# Patient Record
Sex: FEMALE | Race: WHITE | NOT HISPANIC OR LATINO | Employment: OTHER | URBAN - METROPOLITAN AREA
[De-identification: names, ages, dates, MRNs, and addresses within clinical notes are randomized per-mention and may not be internally consistent; named-entity substitution may affect disease eponyms.]

---

## 2018-08-20 ENCOUNTER — TELEPHONE (OUTPATIENT)
Dept: FAMILY MEDICINE CLINIC | Facility: CLINIC | Age: 73
End: 2018-08-20

## 2018-08-20 ENCOUNTER — OFFICE VISIT (OUTPATIENT)
Dept: FAMILY MEDICINE CLINIC | Facility: CLINIC | Age: 73
End: 2018-08-20

## 2018-08-20 VITALS
OXYGEN SATURATION: 98 % | TEMPERATURE: 99.7 F | DIASTOLIC BLOOD PRESSURE: 92 MMHG | HEIGHT: 65 IN | SYSTOLIC BLOOD PRESSURE: 140 MMHG | BODY MASS INDEX: 35.16 KG/M2 | WEIGHT: 211 LBS | HEART RATE: 62 BPM | RESPIRATION RATE: 16 BRPM

## 2018-08-20 DIAGNOSIS — H91.93 BILATERAL HEARING LOSS, UNSPECIFIED HEARING LOSS TYPE: ICD-10-CM

## 2018-08-20 DIAGNOSIS — N95.1 HOT FLASHES DUE TO MENOPAUSE: ICD-10-CM

## 2018-08-20 DIAGNOSIS — G43.009 MIGRAINE WITHOUT AURA AND WITHOUT STATUS MIGRAINOSUS, NOT INTRACTABLE: Primary | ICD-10-CM

## 2018-08-20 DIAGNOSIS — E78.00 PURE HYPERCHOLESTEROLEMIA: ICD-10-CM

## 2018-08-20 DIAGNOSIS — M15.9 PRIMARY OSTEOARTHRITIS INVOLVING MULTIPLE JOINTS: ICD-10-CM

## 2018-08-20 PROBLEM — M15.0 PRIMARY OSTEOARTHRITIS INVOLVING MULTIPLE JOINTS: Status: ACTIVE | Noted: 2018-08-20

## 2018-08-20 PROCEDURE — 99204 OFFICE O/P NEW MOD 45 MIN: CPT | Performed by: FAMILY MEDICINE

## 2018-08-20 RX ORDER — SUMATRIPTAN 50 MG/1
50 TABLET, FILM COATED ORAL ONCE AS NEEDED
Qty: 27 TABLET | Refills: 1 | Status: SHIPPED | OUTPATIENT
Start: 2018-08-20 | End: 2018-10-05 | Stop reason: SDUPTHER

## 2018-08-20 RX ORDER — PRAVASTATIN SODIUM 40 MG
TABLET ORAL
COMMUNITY
End: 2018-08-20 | Stop reason: SDUPTHER

## 2018-08-20 RX ORDER — SUMATRIPTAN SUCCINATE 50 MG
TABLET ORAL
COMMUNITY
Start: 2018-07-30 | End: 2018-08-20

## 2018-08-20 RX ORDER — PROPRANOLOL HYDROCHLORIDE 120 MG/1
CAPSULE, EXTENDED RELEASE ORAL
COMMUNITY
Start: 2018-07-11 | End: 2018-08-20 | Stop reason: SDUPTHER

## 2018-08-20 RX ORDER — CELECOXIB 200 MG/1
200 CAPSULE ORAL DAILY
Qty: 90 CAPSULE | Refills: 1 | Status: SHIPPED | OUTPATIENT
Start: 2018-08-20 | End: 2019-02-28 | Stop reason: SDUPTHER

## 2018-08-20 RX ORDER — PRAVASTATIN SODIUM 40 MG
40 TABLET ORAL NIGHTLY
Qty: 90 TABLET | Refills: 1 | Status: SHIPPED | OUTPATIENT
Start: 2018-08-20 | End: 2019-02-28 | Stop reason: SDUPTHER

## 2018-08-20 RX ORDER — CELECOXIB 200 MG/1
CAPSULE ORAL
COMMUNITY
Start: 2018-07-15 | End: 2018-08-20 | Stop reason: SDUPTHER

## 2018-08-20 RX ORDER — PROPRANOLOL HYDROCHLORIDE 120 MG/1
120 CAPSULE, EXTENDED RELEASE ORAL DAILY
Qty: 90 CAPSULE | Refills: 1 | Status: SHIPPED | OUTPATIENT
Start: 2018-08-20 | End: 2019-02-28 | Stop reason: SDUPTHER

## 2018-08-20 RX ORDER — GARLIC 180 MG
TABLET, DELAYED RELEASE (ENTERIC COATED) ORAL
COMMUNITY
End: 2021-04-27

## 2018-08-20 RX ORDER — SUMATRIPTAN 50 MG/1
TABLET, FILM COATED ORAL
COMMUNITY
End: 2018-08-20 | Stop reason: SDUPTHER

## 2018-08-20 RX ORDER — CHLORAL HYDRATE 500 MG
CAPSULE ORAL
COMMUNITY

## 2018-08-20 RX ORDER — VENLAFAXINE HYDROCHLORIDE 75 MG/1
75 CAPSULE, EXTENDED RELEASE ORAL DAILY
Qty: 90 CAPSULE | Refills: 1 | Status: SHIPPED | OUTPATIENT
Start: 2018-08-20 | End: 2019-02-28 | Stop reason: SDUPTHER

## 2018-08-20 RX ORDER — VENLAFAXINE HYDROCHLORIDE 75 MG/1
CAPSULE, EXTENDED RELEASE ORAL
COMMUNITY
Start: 2018-07-11 | End: 2018-08-20 | Stop reason: SDUPTHER

## 2018-08-20 RX ORDER — METHYLPREDNISOLONE 4 MG
TABLET, DOSE PACK ORAL
COMMUNITY
End: 2021-06-10

## 2018-08-20 NOTE — TELEPHONE ENCOUNTER
----- Message from Raven Douglas MD sent at 8/20/2018  2:06 PM EDT -----  Regarding: req rec  Last hearing test and office note    Soco Elizondo, [facility]  Audiology 08/20/2018 End  8/20/18  Phone: [phone]; Fax: [phone]

## 2018-08-20 NOTE — PROGRESS NOTES
Gavi Nye presents today to establish care.    Chief Complaint   Patient presents with   • Establish Care     Needs a new rx for a hearing test         HPI   Hearing loss:  Hearing aids not doing the same job. Needs a new hearing test prescription. Wearing hearing aid since age 38.     Hot flashes:  Taking effexor for 1-2 years for hot flashes along with black cohosh. Sweat starts pouring. Hysterectomy in her 40s. No depression.   PHQ-2/PHQ-9 Depression Screening 8/20/2018   Little interest or pleasure in doing things 0   Feeling down, depressed, or hopeless 0   Total Score 0         Migraines:  Used to have migraines related to menses. If she gets severe sinus headaches with changes in weather turns into migraine. Takes aspirin and antihistamine first, if getting worse takes Imitrex. Taking propanol helps cut down migraines and not as severe. H/o cluster migraines for 3 days in a row. During migraine diarrhea, nausea w/o vomiting, irritable, needs dark and quiet. Right frontal or bilateral behind the eye, pressure.     Hyperlipidemia:  Taking cholesterol medication for a little over a year. Tries low fat diet, but eats chocolate and cheese. Eating vegetables.     Arthritis:  Bilateral hands, thumbs. S/p left knee surgery. Taking celebrex daily. Stiff when she doesn't take it, especially in knees and tops of feet. Decreased 3 inches in height. Walking and starting this [facility].               Review of Systems   Constitutional: Positive for unexpected weight gain.   HENT: Negative.    Eyes: Positive for discharge and itching.   Respiratory: Positive for wheezing.    Gastrointestinal: Negative.    Endocrine:        Hot flashes   Genitourinary: Positive for urinary incontinence.   Musculoskeletal: Positive for arthralgias.   Skin: Positive for rash.        itching   Allergic/Immunologic: Positive for environmental allergies.   Neurological: Positive for dizziness, syncope, weakness and  headache.   Psychiatric/Behavioral: Negative.         Past Medical History:   Diagnosis Date   • Anemia    • Arthritis    • Asthma    • Cholelithiasis    • H/O knee surgery    • Migraine         Past Surgical History:   Procedure Laterality Date   • KNEE ARTHROSCOPY Left    • PARTIAL KNEE ARTHROPLASTY Left    • ROTATOR CUFF REPAIR Left    • TONSILLECTOMY     • TOTAL ABDOMINAL HYSTERECTOMY WITH SALPINGO OOPHORECTOMY          Family History   Problem Relation Age of Onset   • Arthritis Mother    • Cancer Mother    • Migraines Mother    • Osteoporosis Mother    • Heart attack Father    • Cancer Father    • Thyroid disease Daughter         Social History     Social History   • Marital status:      Spouse name: N/A   • Number of children: N/A   • Years of education: N/A     Occupational History   • Not on file.     Social History Main Topics   • Smoking status: Former Smoker     Packs/day: 1.00     Years: 20.00     Types: Cigarettes     Start date: 8/20/1998     Quit date: 8/2/2001   • Smokeless tobacco: Never Used   • Alcohol use 1.2 - 2.4 oz/week     2 - 4 Glasses of wine per week      Comment: soical    • Drug use: No   • Sexual activity: Not Currently     Partners: Male     Birth control/ protection: None     Other Topics Concern   • Not on file     Social History Narrative   • No narrative on file        Current Outpatient Prescriptions   Medication Sig Dispense Refill   • ASPIRIN 81 PO aspirin     • Black Cohosh 40 MG capsule Take  by mouth.     • celecoxib (CeleBREX) 200 MG capsule      • Cholecalciferol (VITAMIN D) 1000 units tablet Vitamin D     • Glucosamine Sulfate 500 MG tablet glucosamine sulfate 500 mg tablet     • Omega-3 Fatty Acids (FISH OIL) 1000 MG capsule capsule Fish Oil     • pravastatin (PRAVACHOL) 40 MG tablet pravastatin 40 mg tablet     • propranolol LA (INDERAL LA) 120 MG 24 hr capsule      • SUMAtriptan (IMITREX) 50 MG tablet Imitrex 50 mg tablet     • venlafaxine XR (EFFEXOR-XR) 75 MG  "24 hr capsule        No current facility-administered medications for this visit.        No Known Allergies     Visit Vitals  /92   Pulse 62   Temp 99.7 °F (37.6 °C) (Temporal Artery )   Resp 16   Ht 164 cm (64.57\")   Wt 95.7 kg (211 lb)   SpO2 98%   BMI 35.58 kg/m²        Physical Exam   Constitutional: She is oriented to person, place, and time. No distress.   obese   HENT:   Nose: Nose normal.   Mouth/Throat: Oropharynx is clear and moist.   Eyes: Pupils are equal, round, and reactive to light. Conjunctivae are normal.   Neck: Neck supple. No thyromegaly present.   Cardiovascular: Normal rate, regular rhythm and intact distal pulses.    No murmur heard.  Pulses:       Posterior tibial pulses are 2+ on the right side, and 2+ on the left side.   Pulmonary/Chest: Effort normal and breath sounds normal.   Abdominal: Soft. There is no hepatosplenomegaly. There is no tenderness.   Surgical scar   Musculoskeletal: She exhibits no edema.        Left knee: She exhibits no effusion.        Arms:       Legs:  Lymphadenopathy:     She has no cervical adenopathy.   Neurological: She is alert and oriented to person, place, and time.   Skin: Skin is warm and dry. No rash noted.   Psychiatric: She has a normal mood and affect. Her behavior is normal. Judgment and thought content normal.   Vitals reviewed.            Gavi was seen today for establish care.  Obtain prior PCP records, labs, and immunizations.  Patient reports prior shingles vaccine in 2012 and prior pneumococcal vaccine unknown date.  She has had bone density in [id number].  She's had colonoscopy in 2016.  She is unsure when her last mammogram has been.  Diagnoses and all orders for this visit:    Migraine without aura and without status migrainosus, not intractable  -     SUMAtriptan (IMITREX) 50 MG tablet; Take 1 tablet by mouth 1 (One) Time As Needed for Migraine for up to 1 dose. May repeat dose once in 2 hours if needed  -     propranolol LA (INDERAL LA) " 120 MG 24 hr capsule; Take 1 capsule by mouth Daily.  Continue daily suppression with propranolol.  Imitrex when necessary.  Pure hypercholesterolemia  -     pravastatin (PRAVACHOL) 40 MG tablet; Take 1 tablet by mouth Every Night.  Continue statin.  Patient's Body mass index is 35.58 kg/m². BMI is above normal parameters. Recommendations include: exercise counseling and nutrition counseling.  Primary osteoarthritis involving multiple joints  -     celecoxib (CeleBREX) 200 MG capsule; Take 1 capsule by mouth Daily.  Continue Celebrex.  Patient follows with orthopedics.  Hot flashes due to menopause  -     venlafaxine XR (EFFEXOR-XR) 75 MG 24 hr capsule; Take 1 capsule by mouth Daily.  Stable.  Continue venlafaxine.  Bilateral hearing loss, unspecified hearing loss type  -     Audiometry With Tympanometry; Future  Follow-up with audiologist.      Return in about 6 months (around 2/20/2019).

## 2018-08-20 NOTE — TELEPHONE ENCOUNTER
Called pt and requested records until able to give these without her going in office to sign and consent or give a verbal. [facility] office will call her for a verbal consent.

## 2018-10-05 ENCOUNTER — TELEPHONE (OUTPATIENT)
Dept: FAMILY MEDICINE CLINIC | Facility: CLINIC | Age: 73
End: 2018-10-05

## 2018-10-05 DIAGNOSIS — G43.009 MIGRAINE WITHOUT AURA AND WITHOUT STATUS MIGRAINOSUS, NOT INTRACTABLE: ICD-10-CM

## 2018-10-05 RX ORDER — SUMATRIPTAN 50 MG/1
50 TABLET, FILM COATED ORAL ONCE AS NEEDED
Qty: 27 TABLET | Refills: 1 | Status: SHIPPED | OUTPATIENT
Start: 2018-10-05 | End: 2019-02-28 | Stop reason: SDUPTHER

## 2018-12-31 ENCOUNTER — TELEPHONE (OUTPATIENT)
Dept: FAMILY MEDICINE CLINIC | Facility: CLINIC | Age: 73
End: 2018-12-31

## 2019-02-28 ENCOUNTER — TELEPHONE (OUTPATIENT)
Dept: FAMILY MEDICINE CLINIC | Facility: CLINIC | Age: 74
End: 2019-02-28

## 2019-02-28 ENCOUNTER — LAB (OUTPATIENT)
Dept: LAB | Facility: HOSPITAL | Age: 74
End: 2019-02-28

## 2019-02-28 ENCOUNTER — OFFICE VISIT (OUTPATIENT)
Dept: FAMILY MEDICINE CLINIC | Facility: CLINIC | Age: 74
End: 2019-02-28

## 2019-02-28 VITALS
WEIGHT: 212 LBS | DIASTOLIC BLOOD PRESSURE: 80 MMHG | OXYGEN SATURATION: 97 % | SYSTOLIC BLOOD PRESSURE: 122 MMHG | BODY MASS INDEX: 35.32 KG/M2 | HEIGHT: 65 IN | HEART RATE: 78 BPM

## 2019-02-28 DIAGNOSIS — E78.00 PURE HYPERCHOLESTEROLEMIA: ICD-10-CM

## 2019-02-28 DIAGNOSIS — M15.9 PRIMARY OSTEOARTHRITIS INVOLVING MULTIPLE JOINTS: ICD-10-CM

## 2019-02-28 DIAGNOSIS — G43.009 MIGRAINE WITHOUT AURA AND WITHOUT STATUS MIGRAINOSUS, NOT INTRACTABLE: Primary | ICD-10-CM

## 2019-02-28 DIAGNOSIS — N95.1 HOT FLASHES DUE TO MENOPAUSE: ICD-10-CM

## 2019-02-28 DIAGNOSIS — L98.9 NON-HEALING SKIN LESION: ICD-10-CM

## 2019-02-28 DIAGNOSIS — J31.0 CHRONIC RHINITIS: ICD-10-CM

## 2019-02-28 LAB
ALBUMIN SERPL-MCNC: 4.69 G/DL (ref 3.2–4.8)
ALBUMIN/GLOB SERPL: 2.1 G/DL (ref 1.5–2.5)
ALP SERPL-CCNC: 156 U/L (ref 25–100)
ALT SERPL W P-5'-P-CCNC: 21 U/L (ref 7–40)
ANION GAP SERPL CALCULATED.3IONS-SCNC: 7 MMOL/L (ref 3–11)
ARTICHOKE IGE QN: 167 MG/DL (ref 0–130)
AST SERPL-CCNC: 19 U/L (ref 0–33)
BILIRUB SERPL-MCNC: 0.5 MG/DL (ref 0.3–1.2)
BUN BLD-MCNC: 25 MG/DL (ref 9–23)
BUN/CREAT SERPL: 35.2 (ref 7–25)
CALCIUM SPEC-SCNC: 10.1 MG/DL (ref 8.7–10.4)
CHLORIDE SERPL-SCNC: 106 MMOL/L (ref 99–109)
CHOLEST SERPL-MCNC: 237 MG/DL (ref 0–200)
CO2 SERPL-SCNC: 29 MMOL/L (ref 20–31)
CREAT BLD-MCNC: 0.71 MG/DL (ref 0.6–1.3)
GFR SERPL CREATININE-BSD FRML MDRD: 80 ML/MIN/1.73
GLOBULIN UR ELPH-MCNC: 2.2 GM/DL
GLUCOSE BLD-MCNC: 104 MG/DL (ref 70–100)
HDLC SERPL-MCNC: 55 MG/DL (ref 40–60)
POTASSIUM BLD-SCNC: 4.2 MMOL/L (ref 3.5–5.5)
PROT SERPL-MCNC: 6.9 G/DL (ref 5.7–8.2)
SODIUM BLD-SCNC: 142 MMOL/L (ref 132–146)
TRIGL SERPL-MCNC: 169 MG/DL (ref 0–150)

## 2019-02-28 PROCEDURE — 80061 LIPID PANEL: CPT | Performed by: FAMILY MEDICINE

## 2019-02-28 PROCEDURE — 80053 COMPREHEN METABOLIC PANEL: CPT | Performed by: FAMILY MEDICINE

## 2019-02-28 PROCEDURE — 99214 OFFICE O/P EST MOD 30 MIN: CPT | Performed by: FAMILY MEDICINE

## 2019-02-28 RX ORDER — SUMATRIPTAN 50 MG/1
50 TABLET, FILM COATED ORAL ONCE AS NEEDED
Qty: 27 TABLET | Refills: 1 | Status: SHIPPED | OUTPATIENT
Start: 2019-02-28 | End: 2019-08-29 | Stop reason: SDUPTHER

## 2019-02-28 RX ORDER — VENLAFAXINE HYDROCHLORIDE 75 MG/1
75 CAPSULE, EXTENDED RELEASE ORAL DAILY
Qty: 90 CAPSULE | Refills: 1 | Status: SHIPPED | OUTPATIENT
Start: 2019-02-28 | End: 2019-08-29 | Stop reason: SDUPTHER

## 2019-02-28 RX ORDER — CELECOXIB 200 MG/1
200 CAPSULE ORAL DAILY
Qty: 90 CAPSULE | Refills: 1 | Status: SHIPPED | OUTPATIENT
Start: 2019-02-28 | End: 2019-08-29 | Stop reason: SDUPTHER

## 2019-02-28 RX ORDER — PRAVASTATIN SODIUM 80 MG/1
40 TABLET ORAL NIGHTLY
Qty: 90 TABLET | Refills: 1 | Status: SHIPPED | OUTPATIENT
Start: 2019-02-28 | End: 2019-08-29 | Stop reason: SDUPTHER

## 2019-02-28 RX ORDER — PROPRANOLOL HYDROCHLORIDE 120 MG/1
120 CAPSULE, EXTENDED RELEASE ORAL DAILY
Qty: 90 CAPSULE | Refills: 1 | Status: SHIPPED | OUTPATIENT
Start: 2019-02-28 | End: 2019-08-29 | Stop reason: SDUPTHER

## 2019-02-28 RX ORDER — PRAVASTATIN SODIUM 40 MG
40 TABLET ORAL NIGHTLY
Qty: 90 TABLET | Refills: 1 | Status: SHIPPED | OUTPATIENT
Start: 2019-02-28 | End: 2019-02-28 | Stop reason: SDUPTHER

## 2019-02-28 NOTE — PROGRESS NOTES
Chief Complaint   Patient presents with   • Migraine   • Hyperlipidemia   • Abrasion     mole on left arm        Migraine    This is a chronic problem. The pain is located in the frontal (eyes) region. Quality: borrowing hole in head. Associated symptoms include rhinorrhea. Pertinent negatives include no visual change. She has tried triptans, cold packs and beta blockers (relaxation) for the symptoms.   Hyperlipidemia   This is a chronic problem. Current antihyperlipidemic treatment includes statins.   Abrasion   This is a new problem. Associated symptoms include arthralgias. Pertinent negatives include no congestion or visual change.      More frequent sinus headaches and migraines with weather changes. Used Imitrex 3 times in past month.     With the weather not able to get out and exercise.     Effexor keeps hot flashes under control.     Celebrex makes arthritis tolerable. Right knee replaced. She has lost height.     She reports that she has had a bleeding lesion on her left forearm for some time.  She reports it is worse after she showers.     PHQ-2/PHQ-9 Depression Screening 2/28/2019   Little interest or pleasure in doing things 0   Feeling down, depressed, or hopeless 0   Total Score 0     Fall Risk Assessment  Fallen in past 6 months: 0--> No  Mental Status: 0--> no mental status change  Mobility: 0--> No mobility issues  Medications: 0--> No meds  Total Fall Risk Score: 2    Review of Systems   HENT: Positive for rhinorrhea. Negative for congestion.    Eyes: Positive for discharge. Negative for visual disturbance.   Musculoskeletal: Positive for arthralgias.   Neurological: Positive for headache.        Current Outpatient Medications on File Prior to Visit   Medication Sig Dispense Refill   • ASPIRIN 81 PO aspirin     • Black Cohosh 40 MG capsule Take  by mouth.     • Cholecalciferol (VITAMIN D) 1000 units tablet Vitamin D     • Glucosamine Sulfate 500 MG tablet glucosamine sulfate 500 mg tablet     •  Omega-3 Fatty Acids (FISH OIL) 1000 MG capsule capsule Fish Oil     • [DISCONTINUED] celecoxib (CeleBREX) 200 MG capsule Take 1 capsule by mouth Daily. 90 capsule 1   • [DISCONTINUED] pravastatin (PRAVACHOL) 40 MG tablet Take 1 tablet by mouth Every Night. 90 tablet 1   • [DISCONTINUED] propranolol LA (INDERAL LA) 120 MG 24 hr capsule Take 1 capsule by mouth Daily. 90 capsule 1   • [DISCONTINUED] SUMAtriptan (IMITREX) 50 MG tablet Take 1 tablet by mouth 1 (One) Time As Needed for Migraine for up to 1 dose. May repeat dose once in 2 hours if needed 27 tablet 1   • [DISCONTINUED] venlafaxine XR (EFFEXOR-XR) 75 MG 24 hr capsule Take 1 capsule by mouth Daily. 90 capsule 1     No current facility-administered medications on file prior to visit.        No Known Allergies    Past Medical History:   Diagnosis Date   • Anemia    • Arthritis    • Asthma    • Cholelithiasis    • H/O knee surgery    • Migraine         Past Surgical History:   Procedure Laterality Date   • KNEE ARTHROSCOPY Left    • PARTIAL KNEE ARTHROPLASTY Left    • ROTATOR CUFF REPAIR Left    • TONSILLECTOMY     • TOTAL ABDOMINAL HYSTERECTOMY WITH SALPINGO OOPHORECTOMY          Family History   Problem Relation Age of Onset   • Arthritis Mother    • Cancer Mother    • Migraines Mother    • Osteoporosis Mother    • Heart attack Father    • Cancer Father    • Thyroid disease Daughter         Social History     Socioeconomic History   • Marital status:      Spouse name: Not on file   • Number of children: Not on file   • Years of education: Not on file   • Highest education level: Not on file   Social Needs   • Financial resource strain: Not on file   • Food insecurity - worry: Not on file   • Food insecurity - inability: Not on file   • Transportation needs - medical: Not on file   • Transportation needs - non-medical: Not on file   Occupational History   • Not on file   Tobacco Use   • Smoking status: Former Smoker     Packs/day: 1.00     Years: 20.00  "    Pack years: 20.00     Types: Cigarettes     Start date: 1998     Last attempt to quit: 2001     Years since quittin.5   • Smokeless tobacco: Never Used   Substance and Sexual Activity   • Alcohol use: Yes     Alcohol/week: 1.2 - 2.4 oz     Types: 2 - 4 Glasses of wine per week     Comment: soical    • Drug use: No   • Sexual activity: Not Currently     Partners: Male     Birth control/protection: None   Other Topics Concern   • Not on file   Social History Narrative   • Not on file        Visit Vitals  /80 (BP Location: Right arm, Patient Position: Sitting, Cuff Size: Adult)   Pulse 78   Ht 164 cm (64.57\")   Wt 96.2 kg (212 lb)   SpO2 97%   BMI 35.75 kg/m²        Physical Exam   Constitutional: No distress. She is obese.  HENT:   Nose: Mucosal edema ( R>L) present. No rhinorrhea.   Mouth/Throat: Oropharynx is clear and moist and mucous membranes are normal.   Cardiovascular: Normal rate and regular rhythm.   No murmur heard.  Pulmonary/Chest: Effort normal and breath sounds normal.   Skin:   Left forearm 8 mm round rough papule erythematous with speckled brown and flaking   Psychiatric: She has a normal mood and affect.   Vitals reviewed.                 Gavi was seen today for migraine, hyperlipidemia and abrasion.    Diagnoses and all orders for this visit:    Migraine without aura and without status migrainosus, not intractable  -     SUMAtriptan (IMITREX) 50 MG tablet; Take 1 tablet by mouth 1 (One) Time As Needed for Migraine for up to 1 dose. May repeat dose once in 2 hours if needed  -     propranolol LA (INDERAL LA) 120 MG 24 hr capsule; Take 1 capsule by mouth Daily.  Continue propranolol with Imitrex as needed.  Hot flashes due to menopause  -     venlafaxine XR (EFFEXOR-XR) 75 MG 24 hr capsule; Take 1 capsule by mouth Daily.  Stable.  Continue Effexor.  Pure hypercholesterolemia  -     pravastatin (PRAVACHOL) 40 MG tablet; Take 1 tablet by mouth Every Night.  -     Comprehensive " Metabolic Panel; Future  -     Lipid Panel; Future  Check fasting labs today.  Continue statin.  Primary osteoarthritis involving multiple joints  -     celecoxib (CeleBREX) 200 MG capsule; Take 1 capsule by mouth Daily.  Stable.  Continue Celebrex.  Non-healing skin lesion  -     Ambulatory Referral to Dermatology  New.  Worrisome features needs further evaluation by dermatology.  Chronic rhinitis  New.  Recommend trial of a daily antihistamine for the next week to see if symptoms improve.  No signs of sinus infection at this time.      Return in about 6 months (around 8/28/2019) for Medicare Wellness.

## 2019-02-28 NOTE — TELEPHONE ENCOUNTER
The test results show that your cholesterol is elevated.  I recommend increasing the pravastatin dose to 80 mg and I have sent a new prescription to the pharmacy.  Sugar is mildly elevated.  Kidney function is mildly decreased.  All other labs are okay.

## 2019-03-13 ENCOUNTER — TELEPHONE (OUTPATIENT)
Dept: FAMILY MEDICINE CLINIC | Facility: CLINIC | Age: 74
End: 2019-03-13

## 2019-03-13 NOTE — TELEPHONE ENCOUNTER
PA sent through cover my meds.  Approved for medicine.  KEY: [id number].  LM on patient's VM letting her know this was approved.

## 2019-03-13 NOTE — TELEPHONE ENCOUNTER
Migraine without aura and without status migrainosus, not intractable G43.009.  Meds tried include NSAIDs, propranolol, Effexor, Imitrex

## 2019-05-23 ENCOUNTER — PRIOR AUTHORIZATION (OUTPATIENT)
Dept: FAMILY MEDICINE CLINIC | Facility: CLINIC | Age: 74
End: 2019-05-23

## 2019-05-23 NOTE — TELEPHONE ENCOUNTER
[facility] pharmacy sent a pre-auth request for imitrex 50 mg. The pre-auth was approved previous from 2/11/19-3/12/20. I faxed a copy of the approval letter to the pharmacy.

## 2019-05-29 ENCOUNTER — TELEPHONE (OUTPATIENT)
Dept: FAMILY MEDICINE CLINIC | Facility: CLINIC | Age: 74
End: 2019-05-29

## 2019-05-29 NOTE — TELEPHONE ENCOUNTER
Pt already scheduled for pre-op on 6/19 at 3:15pm. Does this need to be closer to surgery appt? Please advise.

## 2019-05-29 NOTE — TELEPHONE ENCOUNTER
Received fax from [facility] that she has cataract surgery scheduled for July 18, 2019.  They need a history and physical.  Please have patient schedule a preop clearance exam.

## 2019-06-15 ENCOUNTER — APPOINTMENT (OUTPATIENT)
Dept: CT IMAGING | Facility: HOSPITAL | Age: 74
End: 2019-06-15

## 2019-06-15 ENCOUNTER — HOSPITAL ENCOUNTER (EMERGENCY)
Facility: HOSPITAL | Age: 74
Discharge: HOME | End: 2019-06-16
Attending: EMERGENCY MEDICINE | Admitting: EMERGENCY MEDICINE

## 2019-06-15 DIAGNOSIS — N20.1 URETEROLITHIASIS: Primary | ICD-10-CM

## 2019-06-15 DIAGNOSIS — N23 RENAL COLIC ON RIGHT SIDE: ICD-10-CM

## 2019-06-15 DIAGNOSIS — R03.0 ELEVATED BLOOD PRESSURE READING: ICD-10-CM

## 2019-06-15 LAB
ALBUMIN SERPL-MCNC: 4.5 G/DL (ref 3.5–5.2)
ALBUMIN/GLOB SERPL: 1.4 G/DL
ALP SERPL-CCNC: 152 U/L (ref 39–117)
ALT SERPL W P-5'-P-CCNC: 19 U/L (ref 1–33)
ANION GAP SERPL CALCULATED.3IONS-SCNC: 14 MMOL/L
AST SERPL-CCNC: 19 U/L (ref 1–32)
BACTERIA UR QL AUTO: ABNORMAL /HPF
BASOPHILS # BLD AUTO: 0.03 10*3/MM3 (ref 0–0.2)
BASOPHILS NFR BLD AUTO: 0.3 % (ref 0–1.5)
BILIRUB SERPL-MCNC: 0.5 MG/DL (ref 0.2–1.2)
BILIRUB UR QL STRIP: NEGATIVE
BUN BLD-MCNC: 30 MG/DL (ref 8–23)
BUN/CREAT SERPL: 34.1 (ref 7–25)
CALCIUM SPEC-SCNC: 10.7 MG/DL (ref 8.6–10.5)
CHLORIDE SERPL-SCNC: 103 MMOL/L (ref 98–107)
CLARITY UR: ABNORMAL
CO2 SERPL-SCNC: 24 MMOL/L (ref 22–29)
COLOR UR: YELLOW
CREAT BLD-MCNC: 0.88 MG/DL (ref 0.57–1)
DEPRECATED RDW RBC AUTO: 48.9 FL (ref 37–54)
EOSINOPHIL # BLD AUTO: 0.05 10*3/MM3 (ref 0–0.4)
EOSINOPHIL NFR BLD AUTO: 0.6 % (ref 0.3–6.2)
ERYTHROCYTE [DISTWIDTH] IN BLOOD BY AUTOMATED COUNT: 14.8 % (ref 12.3–15.4)
GFR SERPL CREATININE-BSD FRML MDRD: 62 ML/MIN/1.73
GLOBULIN UR ELPH-MCNC: 3.2 GM/DL
GLUCOSE BLD-MCNC: 143 MG/DL (ref 65–99)
GLUCOSE UR STRIP-MCNC: NEGATIVE MG/DL
HCT VFR BLD AUTO: 48.2 % (ref 34–46.6)
HGB BLD-MCNC: 15.1 G/DL (ref 12–15.9)
HGB UR QL STRIP.AUTO: NEGATIVE
HOLD SPECIMEN: NORMAL
HYALINE CASTS UR QL AUTO: ABNORMAL /LPF
IMM GRANULOCYTES # BLD AUTO: 0.02 10*3/MM3 (ref 0–0.05)
IMM GRANULOCYTES NFR BLD AUTO: 0.2 % (ref 0–0.5)
KETONES UR QL STRIP: ABNORMAL
LEUKOCYTE ESTERASE UR QL STRIP.AUTO: NEGATIVE
LIPASE SERPL-CCNC: 19 U/L (ref 13–60)
LYMPHOCYTES # BLD AUTO: 0.95 10*3/MM3 (ref 0.7–3.1)
LYMPHOCYTES NFR BLD AUTO: 10.8 % (ref 19.6–45.3)
MCH RBC QN AUTO: 28.1 PG (ref 26.6–33)
MCHC RBC AUTO-ENTMCNC: 31.3 G/DL (ref 31.5–35.7)
MCV RBC AUTO: 89.8 FL (ref 79–97)
MONOCYTES # BLD AUTO: 0.48 10*3/MM3 (ref 0.1–0.9)
MONOCYTES NFR BLD AUTO: 5.5 % (ref 5–12)
NEUTROPHILS # BLD AUTO: 7.23 10*3/MM3 (ref 1.7–7)
NEUTROPHILS NFR BLD AUTO: 82.6 % (ref 42.7–76)
NITRITE UR QL STRIP: NEGATIVE
NRBC BLD AUTO-RTO: 0 /100 WBC (ref 0–0.2)
PH UR STRIP.AUTO: <=5 [PH] (ref 5–8)
PLATELET # BLD AUTO: 258 10*3/MM3 (ref 140–450)
PMV BLD AUTO: 10.2 FL (ref 6–12)
POTASSIUM BLD-SCNC: 4.3 MMOL/L (ref 3.5–5.2)
PROT SERPL-MCNC: 7.7 G/DL (ref 6–8.5)
PROT UR QL STRIP: NEGATIVE
RBC # BLD AUTO: 5.37 10*6/MM3 (ref 3.77–5.28)
RBC # UR: ABNORMAL /HPF
REF LAB TEST METHOD: ABNORMAL
SODIUM BLD-SCNC: 141 MMOL/L (ref 136–145)
SP GR UR STRIP: 1.02 (ref 1–1.03)
SQUAMOUS #/AREA URNS HPF: ABNORMAL /HPF
UROBILINOGEN UR QL STRIP: ABNORMAL
WBC NRBC COR # BLD: 8.76 10*3/MM3 (ref 3.4–10.8)
WBC UR QL AUTO: ABNORMAL /HPF
WHOLE BLOOD HOLD SPECIMEN: NORMAL
WHOLE BLOOD HOLD SPECIMEN: NORMAL

## 2019-06-15 PROCEDURE — 80053 COMPREHEN METABOLIC PANEL: CPT | Performed by: EMERGENCY MEDICINE

## 2019-06-15 PROCEDURE — 25010000002 KETOROLAC TROMETHAMINE PER 15 MG: Performed by: EMERGENCY MEDICINE

## 2019-06-15 PROCEDURE — 83690 ASSAY OF LIPASE: CPT | Performed by: EMERGENCY MEDICINE

## 2019-06-15 PROCEDURE — 99284 EMERGENCY DEPT VISIT MOD MDM: CPT

## 2019-06-15 PROCEDURE — 81001 URINALYSIS AUTO W/SCOPE: CPT | Performed by: EMERGENCY MEDICINE

## 2019-06-15 PROCEDURE — 96375 TX/PRO/DX INJ NEW DRUG ADDON: CPT

## 2019-06-15 PROCEDURE — 96374 THER/PROPH/DIAG INJ IV PUSH: CPT

## 2019-06-15 PROCEDURE — 74176 CT ABD & PELVIS W/O CONTRAST: CPT

## 2019-06-15 PROCEDURE — 85025 COMPLETE CBC W/AUTO DIFF WBC: CPT | Performed by: EMERGENCY MEDICINE

## 2019-06-15 RX ORDER — PHENAZOPYRIDINE HYDROCHLORIDE 100 MG/1
200 TABLET, FILM COATED ORAL ONCE
Status: COMPLETED | OUTPATIENT
Start: 2019-06-15 | End: 2019-06-15

## 2019-06-15 RX ORDER — KETOROLAC TROMETHAMINE 15 MG/ML
10 INJECTION, SOLUTION INTRAMUSCULAR; INTRAVENOUS ONCE
Status: COMPLETED | OUTPATIENT
Start: 2019-06-15 | End: 2019-06-15

## 2019-06-15 RX ORDER — SODIUM CHLORIDE 0.9 % (FLUSH) 0.9 %
10 SYRINGE (ML) INJECTION AS NEEDED
Status: DISCONTINUED | OUTPATIENT
Start: 2019-06-15 | End: 2019-06-16 | Stop reason: HOSPADM

## 2019-06-15 RX ADMIN — KETOROLAC TROMETHAMINE 10 MG: 15 INJECTION, SOLUTION INTRAMUSCULAR; INTRAVENOUS at 22:51

## 2019-06-15 RX ADMIN — SODIUM CHLORIDE, POTASSIUM CHLORIDE, SODIUM LACTATE AND CALCIUM CHLORIDE 1000 ML: 600; 310; 30; 20 INJECTION, SOLUTION INTRAVENOUS at 22:51

## 2019-06-15 RX ADMIN — LIDOCAINE HYDROCHLORIDE 125 MG: 10 INJECTION, SOLUTION EPIDURAL; INFILTRATION; INTRACAUDAL; PERINEURAL at 22:56

## 2019-06-15 RX ADMIN — PHENAZOPYRIDINE HYDROCHLORIDE 200 MG: 100 TABLET ORAL at 22:53

## 2019-06-16 VITALS
RESPIRATION RATE: 18 BRPM | WEIGHT: 200 LBS | OXYGEN SATURATION: 94 % | HEART RATE: 76 BPM | TEMPERATURE: 98.5 F | BODY MASS INDEX: 36.8 KG/M2 | DIASTOLIC BLOOD PRESSURE: 54 MMHG | HEIGHT: 62 IN | SYSTOLIC BLOOD PRESSURE: 129 MMHG

## 2019-06-16 RX ORDER — TRAMADOL HYDROCHLORIDE 50 MG/1
50 TABLET ORAL EVERY 6 HOURS PRN
Qty: 12 TABLET | Refills: 0 | Status: SHIPPED | OUTPATIENT
Start: 2019-06-16 | End: 2019-06-19

## 2019-06-16 RX ORDER — PHENAZOPYRIDINE HYDROCHLORIDE 200 MG/1
200 TABLET, FILM COATED ORAL 3 TIMES DAILY PRN
Qty: 9 TABLET | Refills: 0 | Status: SHIPPED | OUTPATIENT
Start: 2019-06-16 | End: 2019-06-19

## 2019-06-16 RX ORDER — KETOROLAC TROMETHAMINE 10 MG/1
10 TABLET, FILM COATED ORAL EVERY 6 HOURS PRN
Qty: 20 TABLET | Refills: 0 | Status: SHIPPED | OUTPATIENT
Start: 2019-06-16 | End: 2019-06-19

## 2019-06-19 ENCOUNTER — TELEPHONE (OUTPATIENT)
Dept: FAMILY MEDICINE CLINIC | Facility: CLINIC | Age: 74
End: 2019-06-19

## 2019-06-19 ENCOUNTER — OFFICE VISIT (OUTPATIENT)
Dept: FAMILY MEDICINE CLINIC | Facility: CLINIC | Age: 74
End: 2019-06-19

## 2019-06-19 VITALS
BODY MASS INDEX: 39.2 KG/M2 | HEART RATE: 85 BPM | HEIGHT: 62 IN | OXYGEN SATURATION: 97 % | WEIGHT: 213 LBS | SYSTOLIC BLOOD PRESSURE: 132 MMHG | DIASTOLIC BLOOD PRESSURE: 84 MMHG

## 2019-06-19 DIAGNOSIS — Z01.818 PRE-OP EVALUATION: Primary | ICD-10-CM

## 2019-06-19 DIAGNOSIS — L98.9 NON-HEALING SKIN LESION: ICD-10-CM

## 2019-06-19 DIAGNOSIS — N20.0 NEPHROLITHIASIS: ICD-10-CM

## 2019-06-19 PROCEDURE — 99214 OFFICE O/P EST MOD 30 MIN: CPT | Performed by: FAMILY MEDICINE

## 2019-06-19 NOTE — PROGRESS NOTES
Chief Complaint   Patient presents with   • Pre-op Exam     cataract surgery        HPI   Pre-op physical:  Right eye cataract surgery 7/18/19 and left eye 7/25/19.     Cough improved, recent viral infection exposed to sick grandson.     She recently had kidney stone 6/15/19. No longer taking pain medications. Taking pyridium just in case. Pain had started on right side and she was worried about appendicitis.     Her left forearm has a scab that repeatedly bleeds especially noticed after she showers.    Review of Systems   Respiratory: Positive for cough.    Cardiovascular: Negative for chest pain.   Gastrointestinal: Negative for abdominal pain.   Skin: Positive for skin lesions.        Current Outpatient Medications on File Prior to Visit   Medication Sig Dispense Refill   • ASPIRIN 81 PO aspirin     • Black Cohosh 40 MG capsule Take  by mouth.     • celecoxib (CeleBREX) 200 MG capsule Take 1 capsule by mouth Daily. 90 capsule 1   • Cholecalciferol (VITAMIN D) 1000 units tablet Vitamin D     • Glucosamine Sulfate 500 MG tablet glucosamine sulfate 500 mg tablet     • Omega-3 Fatty Acids (FISH OIL) 1000 MG capsule capsule Fish Oil     • pravastatin (PRAVACHOL) 80 MG tablet Take 0.5 tablets by mouth Every Night. 90 tablet 1   • propranolol LA (INDERAL LA) 120 MG 24 hr capsule Take 1 capsule by mouth Daily. 90 capsule 1   • SUMAtriptan (IMITREX) 50 MG tablet Take 1 tablet by mouth 1 (One) Time As Needed for Migraine for up to 1 dose. May repeat dose once in 2 hours if needed 27 tablet 1   • venlafaxine XR (EFFEXOR-XR) 75 MG 24 hr capsule Take 1 capsule by mouth Daily. 90 capsule 1   • [DISCONTINUED] ketorolac (TORADOL) 10 MG tablet Take 1 tablet by mouth Every 6 (Six) Hours As Needed for Moderate Pain . 20 tablet 0   • [DISCONTINUED] phenazopyridine (PYRIDIUM) 200 MG tablet Take 1 tablet by mouth 3 (Three) Times a Day As Needed for bladder spasms. 9 tablet 0   • [DISCONTINUED] traMADol (ULTRAM) 50 MG tablet Take 1  "tablet by mouth Every 6 (Six) Hours As Needed for Moderate Pain . 12 tablet 0     No current facility-administered medications on file prior to visit.        No Known Allergies    Past Medical History:   Diagnosis Date   • Anemia    • Arthritis    • Asthma    • Cholelithiasis    • H/O knee surgery    • Migraine         Past Surgical History:   Procedure Laterality Date   • CATARACT EXTRACTION, BILATERAL     • KNEE ARTHROSCOPY Left    • PARTIAL KNEE ARTHROPLASTY Left    • ROTATOR CUFF REPAIR Left    • TONSILLECTOMY     • TOTAL ABDOMINAL HYSTERECTOMY WITH SALPINGO OOPHORECTOMY          Family History   Problem Relation Age of Onset   • Arthritis Mother    • Cancer Mother    • Migraines Mother    • Osteoporosis Mother    • Heart attack Father    • Cancer Father    • Thyroid disease Daughter         Social History     Socioeconomic History   • Marital status:      Spouse name: Yoav Nye   • Number of children: Not on file   • Years of education: Not on file   • Highest education level: Not on file   Tobacco Use   • Smoking status: Former Smoker     Packs/day: 1.00     Years: 20.00     Pack years: 20.00     Types: Cigarettes     Start date: 1998     Last attempt to quit: 2001     Years since quittin.8   • Smokeless tobacco: Never Used   Substance and Sexual Activity   • Alcohol use: Yes     Alcohol/week: 1.2 - 2.4 oz     Types: 2 - 4 Glasses of wine per week     Comment: soical    • Drug use: No   • Sexual activity: Not Currently     Partners: Male     Birth control/protection: None        Visit Vitals  /84 (BP Location: Left arm, Patient Position: Sitting, Cuff Size: Adult)   Pulse 85   Ht 157.5 cm (62\")   Wt 96.6 kg (213 lb)   SpO2 97%   BMI 38.96 kg/m²        Physical Exam   Constitutional: She is oriented to person, place, and time. No distress. She is obese.  HENT:   Nose: Nose normal.   Mouth/Throat: Oropharynx is clear and moist.   Eyes: Conjunctivae are normal. Pupils are equal, " round, and reactive to light.   Neck: Neck supple. No thyromegaly present.   Cardiovascular: Normal rate and regular rhythm.   No murmur heard.  Pulses:       Posterior tibial pulses are 2+ on the right side, and 2+ on the left side.   Pulmonary/Chest: Effort normal and breath sounds normal.   Abdominal: Soft. There is no hepatosplenomegaly. There is no tenderness.   Surgical scar   Musculoskeletal: She exhibits no edema.   Lymphadenopathy:     She has no cervical adenopathy.   Neurological: She is alert and oriented to person, place, and time.   Skin: Skin is warm and dry.   Left forearm 6 mm ulceration with recent bleeding   Psychiatric: She has a normal mood and affect.   Vitals reviewed.     Ct Abdomen Pelvis Without Contrast    Result Date: 6/15/2019  Moderate right hydronephrosis secondary to a stone at the base of the urinary bladder, about 6 mm in size and in the region of the right ureterovesical orifice. No additional renal or ureteral stones on either side. Unusual sclerotic bone lesion at L5, probably an unusually prominent benign bone island, but atypical in its size. Recommend at least correlation for any personal history of malignancy. Signer Name: Jovan Moran MD  Signed: 6/15/2019 11:57 PM  Workstation Name: [id number]       Results for orders placed or performed during the hospital encounter of 06/15/19   Comprehensive Metabolic Panel   Result Value Ref Range    Glucose 143 (H) 65 - 99 mg/dL    BUN 30 (H) 8 - 23 mg/dL    Creatinine 0.88 0.57 - 1.00 mg/dL    Sodium 141 136 - 145 mmol/L    Potassium 4.3 3.5 - 5.2 mmol/L    Chloride 103 98 - 107 mmol/L    CO2 24.0 22.0 - 29.0 mmol/L    Calcium 10.7 (H) 8.6 - 10.5 mg/dL    Total Protein 7.7 6.0 - 8.5 g/dL    Albumin 4.50 3.50 - 5.20 g/dL    ALT (SGPT) 19 1 - 33 U/L    AST (SGOT) 19 1 - 32 U/L    Alkaline Phosphatase 152 (H) 39 - 117 U/L    Total Bilirubin 0.5 0.2 - 1.2 mg/dL    eGFR Non African Amer 62 >60 mL/min/1.73    Globulin 3.2 gm/dL    A/G  Ratio 1.4 g/dL    BUN/Creatinine Ratio 34.1 (H) 7.0 - 25.0    Anion Gap 14.0 mmol/L   Lipase   Result Value Ref Range    Lipase 19 13 - 60 U/L   CBC Auto Differential   Result Value Ref Range    WBC 8.76 3.40 - 10.80 10*3/mm3    RBC 5.37 (H) 3.77 - 5.28 10*6/mm3    Hemoglobin 15.1 12.0 - 15.9 g/dL    Hematocrit 48.2 (H) 34.0 - 46.6 %    MCV 89.8 79.0 - 97.0 fL    MCH 28.1 26.6 - 33.0 pg    MCHC 31.3 (L) 31.5 - 35.7 g/dL    RDW 14.8 12.3 - 15.4 %    RDW-SD 48.9 37.0 - 54.0 fl    MPV 10.2 6.0 - 12.0 fL    Platelets 258 140 - 450 10*3/mm3    Neutrophil % 82.6 (H) 42.7 - 76.0 %    Lymphocyte % 10.8 (L) 19.6 - 45.3 %    Monocyte % 5.5 5.0 - 12.0 %    Eosinophil % 0.6 0.3 - 6.2 %    Basophil % 0.3 0.0 - 1.5 %    Immature Grans % 0.2 0.0 - 0.5 %    Neutrophils, Absolute 7.23 (H) 1.70 - 7.00 10*3/mm3    Lymphocytes, Absolute 0.95 0.70 - 3.10 10*3/mm3    Monocytes, Absolute 0.48 0.10 - 0.90 10*3/mm3    Eosinophils, Absolute 0.05 0.00 - 0.40 10*3/mm3    Basophils, Absolute 0.03 0.00 - 0.20 10*3/mm3    Immature Grans, Absolute 0.02 0.00 - 0.05 10*3/mm3    nRBC 0.0 0.0 - 0.2 /100 WBC   Urinalysis With Culture If Indicated - Urine, Clean Catch   Result Value Ref Range    Color, UA Yellow Yellow, Straw    Appearance, UA Cloudy (A) Clear    pH, UA <=5.0 5.0 - 8.0    Specific Gravity, UA 1.022 1.001 - 1.030    Glucose, UA Negative Negative    Ketones, UA Trace (A) Negative    Bilirubin, UA Negative Negative    Blood, UA Negative Negative    Protein, UA Negative Negative    Leuk Esterase, UA Negative Negative    Nitrite, UA Negative Negative    Urobilinogen, UA 0.2 E.U./dL 0.2 - 1.0 E.U./dL   Urinalysis, Microscopic Only - Urine, Clean Catch   Result Value Ref Range    RBC, UA 0-2 None Seen, 0-2 /HPF    WBC, UA 0-2 None Seen, 0-2 /HPF    Bacteria, UA None Seen None Seen, Trace /HPF    Squamous Epithelial Cells, UA 3-6 (A) None Seen, 0-2 /HPF    Hyaline Casts, UA 0-6 0 - 6 /LPF    Methodology Automated Microscopy    Light Blue Top    Result Value Ref Range    Extra Tube hold for add-on    Green Top (Gel)   Result Value Ref Range    Extra Tube Hold for add-ons.    Lavender Top   Result Value Ref Range    Extra Tube hold for add-on    Gold Top - SST   Result Value Ref Range    Extra Tube Hold for add-ons.    Green Top (No Gel)   Result Value Ref Range    Extra Tube Hold for add-ons.         Gavi was seen today for pre-op exam.    Diagnoses and all orders for this visit:    Pre-op evaluation  Please see scanned note from [facility] eye surgery.  No indication for EKG, echo, additional blood work.  Please see blood work which was performed in the emergency room included in the note above.  Of note she has a prescription for Celebrex and I recommend that she discontinue that for 5 to 7 days prior to surgery and use Tylenol as needed for pain.  In my professional opinion Gavi Newman is capable of undergoing outpatient ophthalmic surgery under general anesthesia  Nephrolithiasis  Pain has resolved and she is no longer on tramadol or ketorolac.  Discussed with patient discontinuing Pyridium.  Non-healing skin lesion  -     Ambulatory Referral to Dermatology  Patient has a concerning lesion on her left forearm.  Recommend further evaluation with dermatology and possible biopsy.

## 2019-06-19 NOTE — TELEPHONE ENCOUNTER
Please fax office note along with H&P to [facility] eye surgery [phone].  She is cleared for ophthalmic surgery.

## 2019-08-29 ENCOUNTER — APPOINTMENT (OUTPATIENT)
Dept: LAB | Facility: HOSPITAL | Age: 74
End: 2019-08-29

## 2019-08-29 ENCOUNTER — OFFICE VISIT (OUTPATIENT)
Dept: FAMILY MEDICINE CLINIC | Facility: CLINIC | Age: 74
End: 2019-08-29

## 2019-08-29 VITALS
OXYGEN SATURATION: 97 % | BODY MASS INDEX: 39.79 KG/M2 | SYSTOLIC BLOOD PRESSURE: 126 MMHG | HEART RATE: 56 BPM | WEIGHT: 216.2 LBS | DIASTOLIC BLOOD PRESSURE: 78 MMHG | HEIGHT: 62 IN

## 2019-08-29 DIAGNOSIS — Z13.0 SCREENING FOR DEFICIENCY ANEMIA: ICD-10-CM

## 2019-08-29 DIAGNOSIS — M15.9 PRIMARY OSTEOARTHRITIS INVOLVING MULTIPLE JOINTS: ICD-10-CM

## 2019-08-29 DIAGNOSIS — Z13.1 SCREENING FOR DIABETES MELLITUS: ICD-10-CM

## 2019-08-29 DIAGNOSIS — G43.009 MIGRAINE WITHOUT AURA AND WITHOUT STATUS MIGRAINOSUS, NOT INTRACTABLE: ICD-10-CM

## 2019-08-29 DIAGNOSIS — Z23 FLU VACCINE NEED: ICD-10-CM

## 2019-08-29 DIAGNOSIS — E78.00 PURE HYPERCHOLESTEROLEMIA: ICD-10-CM

## 2019-08-29 DIAGNOSIS — Z13.29 SCREENING FOR THYROID DISORDER: ICD-10-CM

## 2019-08-29 DIAGNOSIS — E55.9 VITAMIN D DEFICIENCY: ICD-10-CM

## 2019-08-29 DIAGNOSIS — R73.01 ELEVATED FASTING GLUCOSE: ICD-10-CM

## 2019-08-29 DIAGNOSIS — Z00.00 WELL ADULT EXAM: Primary | ICD-10-CM

## 2019-08-29 DIAGNOSIS — Z23 NEED FOR PNEUMOCOCCAL VACCINE: ICD-10-CM

## 2019-08-29 DIAGNOSIS — Z12.39 SCREENING FOR BREAST CANCER: ICD-10-CM

## 2019-08-29 LAB
25(OH)D3 SERPL-MCNC: 40.1 NG/ML (ref 30–100)
ALBUMIN SERPL-MCNC: 4.3 G/DL (ref 3.5–5.2)
ALBUMIN/GLOB SERPL: 1.4 G/DL
ALP SERPL-CCNC: 123 U/L (ref 39–117)
ALT SERPL W P-5'-P-CCNC: 14 U/L (ref 1–33)
ANION GAP SERPL CALCULATED.3IONS-SCNC: 11.6 MMOL/L (ref 5–15)
AST SERPL-CCNC: 17 U/L (ref 1–32)
BASOPHILS # BLD AUTO: 0.04 10*3/MM3 (ref 0–0.2)
BASOPHILS NFR BLD AUTO: 0.8 % (ref 0–1.5)
BILIRUB SERPL-MCNC: 0.4 MG/DL (ref 0.2–1.2)
BUN BLD-MCNC: 32 MG/DL (ref 8–23)
BUN/CREAT SERPL: 46.4 (ref 7–25)
CALCIUM SPEC-SCNC: 10.3 MG/DL (ref 8.6–10.5)
CHLORIDE SERPL-SCNC: 100 MMOL/L (ref 98–107)
CHOLEST SERPL-MCNC: 199 MG/DL (ref 0–200)
CO2 SERPL-SCNC: 28.4 MMOL/L (ref 22–29)
CREAT BLD-MCNC: 0.69 MG/DL (ref 0.57–1)
DEPRECATED RDW RBC AUTO: 52.4 FL (ref 37–54)
EOSINOPHIL # BLD AUTO: 0.12 10*3/MM3 (ref 0–0.4)
EOSINOPHIL NFR BLD AUTO: 2.5 % (ref 0.3–6.2)
ERYTHROCYTE [DISTWIDTH] IN BLOOD BY AUTOMATED COUNT: 15.1 % (ref 12.3–15.4)
GFR SERPL CREATININE-BSD FRML MDRD: 83 ML/MIN/1.73
GLOBULIN UR ELPH-MCNC: 3.1 GM/DL
GLUCOSE BLD-MCNC: 104 MG/DL (ref 65–99)
HCT VFR BLD AUTO: 49.5 % (ref 34–46.6)
HDLC SERPL-MCNC: 57 MG/DL (ref 40–60)
HGB BLD-MCNC: 14.9 G/DL (ref 12–15.9)
IMM GRANULOCYTES # BLD AUTO: 0.01 10*3/MM3 (ref 0–0.05)
IMM GRANULOCYTES NFR BLD AUTO: 0.2 % (ref 0–0.5)
LDLC SERPL CALC-MCNC: 100 MG/DL (ref 0–100)
LDLC/HDLC SERPL: 1.75 {RATIO}
LYMPHOCYTES # BLD AUTO: 1.82 10*3/MM3 (ref 0.7–3.1)
LYMPHOCYTES NFR BLD AUTO: 37.7 % (ref 19.6–45.3)
MCH RBC QN AUTO: 28.3 PG (ref 26.6–33)
MCHC RBC AUTO-ENTMCNC: 30.1 G/DL (ref 31.5–35.7)
MCV RBC AUTO: 94.1 FL (ref 79–97)
MONOCYTES # BLD AUTO: 0.57 10*3/MM3 (ref 0.1–0.9)
MONOCYTES NFR BLD AUTO: 11.8 % (ref 5–12)
NEUTROPHILS # BLD AUTO: 2.27 10*3/MM3 (ref 1.7–7)
NEUTROPHILS NFR BLD AUTO: 47 % (ref 42.7–76)
NRBC BLD AUTO-RTO: 0 /100 WBC (ref 0–0.2)
PLATELET # BLD AUTO: 240 10*3/MM3 (ref 140–450)
PMV BLD AUTO: 10.8 FL (ref 6–12)
POTASSIUM BLD-SCNC: 4.3 MMOL/L (ref 3.5–5.2)
PROT SERPL-MCNC: 7.4 G/DL (ref 6–8.5)
RBC # BLD AUTO: 5.26 10*6/MM3 (ref 3.77–5.28)
SODIUM BLD-SCNC: 140 MMOL/L (ref 136–145)
TRIGL SERPL-MCNC: 212 MG/DL (ref 0–150)
TSH SERPL DL<=0.05 MIU/L-ACNC: 1.13 UIU/ML (ref 0.27–4.2)
VLDLC SERPL-MCNC: 42.4 MG/DL (ref 5–40)
WBC NRBC COR # BLD: 4.83 10*3/MM3 (ref 3.4–10.8)

## 2019-08-29 PROCEDURE — 90653 IIV ADJUVANT VACCINE IM: CPT | Performed by: FAMILY MEDICINE

## 2019-08-29 PROCEDURE — 83036 HEMOGLOBIN GLYCOSYLATED A1C: CPT | Performed by: FAMILY MEDICINE

## 2019-08-29 PROCEDURE — 80061 LIPID PANEL: CPT | Performed by: FAMILY MEDICINE

## 2019-08-29 PROCEDURE — G0439 PPPS, SUBSEQ VISIT: HCPCS | Performed by: FAMILY MEDICINE

## 2019-08-29 PROCEDURE — 80053 COMPREHEN METABOLIC PANEL: CPT | Performed by: FAMILY MEDICINE

## 2019-08-29 PROCEDURE — 85025 COMPLETE CBC W/AUTO DIFF WBC: CPT | Performed by: FAMILY MEDICINE

## 2019-08-29 PROCEDURE — G0008 ADMIN INFLUENZA VIRUS VAC: HCPCS | Performed by: FAMILY MEDICINE

## 2019-08-29 PROCEDURE — G0009 ADMIN PNEUMOCOCCAL VACCINE: HCPCS | Performed by: FAMILY MEDICINE

## 2019-08-29 PROCEDURE — 90670 PCV13 VACCINE IM: CPT | Performed by: FAMILY MEDICINE

## 2019-08-29 PROCEDURE — 82306 VITAMIN D 25 HYDROXY: CPT | Performed by: FAMILY MEDICINE

## 2019-08-29 PROCEDURE — 84443 ASSAY THYROID STIM HORMONE: CPT | Performed by: FAMILY MEDICINE

## 2019-08-29 RX ORDER — PROPRANOLOL HYDROCHLORIDE 120 MG/1
120 CAPSULE, EXTENDED RELEASE ORAL DAILY
Qty: 90 CAPSULE | Refills: 3 | Status: SHIPPED | OUTPATIENT
Start: 2019-08-29 | End: 2021-04-27 | Stop reason: SDUPTHER

## 2019-08-29 RX ORDER — SUMATRIPTAN 50 MG/1
50 TABLET, FILM COATED ORAL ONCE AS NEEDED
Qty: 27 TABLET | Refills: 3 | Status: SHIPPED | OUTPATIENT
Start: 2019-08-29 | End: 2020-05-26 | Stop reason: SDUPTHER

## 2019-08-29 RX ORDER — CELECOXIB 200 MG/1
200 CAPSULE ORAL DAILY
Qty: 90 CAPSULE | Refills: 3 | Status: SHIPPED | OUTPATIENT
Start: 2019-08-29 | End: 2021-04-27 | Stop reason: SDUPTHER

## 2019-08-29 RX ORDER — VENLAFAXINE HYDROCHLORIDE 75 MG/1
75 CAPSULE, EXTENDED RELEASE ORAL DAILY
Qty: 90 CAPSULE | Refills: 3 | Status: SHIPPED | OUTPATIENT
Start: 2019-08-29 | End: 2021-04-27

## 2019-08-29 RX ORDER — PRAVASTATIN SODIUM 80 MG/1
40 TABLET ORAL NIGHTLY
Qty: 90 TABLET | Refills: 3 | Status: SHIPPED | OUTPATIENT
Start: 2019-08-29 | End: 2021-04-27 | Stop reason: SDUPTHER

## 2019-08-29 NOTE — PROGRESS NOTES
The ABCs of the Annual Wellness Visit  Subsequent Medicare Wellness Visit    Chief Complaint   Patient presents with   • Medicare Wellness-subsequent       Subjective   History of Present Illness:  Gavi Nye is a 74 y.o. female who presents for a Subsequent Medicare Wellness Visit.    Previously had a pneumonia vaccine given in her 60s when she lived in [place].     Previously had colonoscopy screening.     Gets headaches most morning, takes excedrin. Imitrex when she gets migraine, 2-3 times a month. Also taking effexor and propranolol.         HEALTH RISK ASSESSMENT    Recent Hospitalizations:  No hospitalization(s) within the last year.    Current Medical Providers:  Patient Care Team:  Raven Okeefe MD as PCP - General (Family Medicine)  Aicha Kasper MD as Consulting Physician (Orthopedic Surgery)  Soco Elizondo CCC-A (Audiology)    Smoking Status:  Social History     Tobacco Use   Smoking Status Former Smoker   • Packs/day: 1.00   • Years: 20.00   • Pack years: 20.00   • Types: Cigarettes   • Start date: 1998   • Last attempt to quit: 2001   • Years since quittin.0   Smokeless Tobacco Never Used       Alcohol Consumption:  Social History     Substance and Sexual Activity   Alcohol Use Yes   • Alcohol/week: 1.2 - 2.4 oz   • Types: 2 - 4 Glasses of wine per week    Comment: luis alberto        Depression Screen:   PHQ-2/PHQ-9 Depression Screening 2019   Little interest or pleasure in doing things 0   Feeling down, depressed, or hopeless 0   Total Score 0       Fall Risk Screen:  SEAN Fall Risk Assessment was completed, and patient is at LOW risk for falls.Assessment completed on:2019    Health Habits and Functional and Cognitive Screening:  Functional & Cognitive Status 2019   Do you have difficulty preparing food and eating? No   Do you have difficulty bathing yourself, getting dressed or grooming yourself? No   Do you have difficulty using the toilet? No    Do you have difficulty moving around from place to place? No   Do you have trouble with steps or getting out of a bed or a chair? No   Current Diet Well Balanced Diet   Dental Exam Up to date   Eye Exam Up to date   Exercise (times per week) 0 times per week   Current Exercise Activities Include None   Do you need help using the phone?  No   Are you deaf or do you have serious difficulty hearing?  Yes   Do you need help with transportation? No   Do you need help shopping? No   Do you need help preparing meals?  No   Do you need help with housework?  No   Do you need help with laundry? No   Do you need help taking your medications? No   Do you need help managing money? No   Do you ever drive or ride in a car without wearing a seat belt? No   Have you felt unusual stress, anger or loneliness in the last month? No   Who do you live with? Spouse   If you need help, do you have trouble finding someone available to you? No   Have you been bothered in the last four weeks by sexual problems? No   Do you have difficulty concentrating, remembering or making decisions? Yes         Does the patient have evidence of cognitive impairment? No       Asprin use counseling:Does not need ASA (and currently is not on it)    Age-appropriate Screening Schedule:  Refer to the list below for future screening recommendations based on patient's age, sex and/or medical conditions. Orders for these recommended tests are listed in the plan section. The patient has been provided with a written plan.    Health Maintenance   Topic Date Due   • TDAP/TD VACCINES (1 - Tdap) 04/04/1962   • ZOSTER VACCINE (1 of 2) 04/04/1993   • COLONOSCOPY  08/20/2018   • LIPID PANEL  02/28/2020   • PNEUMOCOCCAL VACCINES (65+ LOW/MEDIUM RISK) (2 of 2 - PPSV23) 08/29/2020   • INFLUENZA VACCINE  Completed     Immunization History   Administered Date(s) Administered   • FLUAD TRI 65YR+ 08/29/2019   • Pneumococcal Conjugate 13-Valent (PCV13) 08/29/2019          The  following portions of the patient's history were reviewed and updated as appropriate: She  has a past medical history of Anemia, Arthritis, Asthma, Cholelithiasis, Migraine, Pneumonia, and Wears hearing aid.  She  has a past surgical history that includes Tonsillectomy; Total abdominal hysterectomy w/ bilateral salpingoophorectomy; Rotator cuff repair (Left); Knee arthroscopy (Left); Partial knee arthroplasty (Left); Cataract extraction, bilateral; and Eye surgery (Bilateral).  Her family history includes Arthritis in her mother; Cancer in her father and mother; Heart attack in her father; Migraines in her mother; Osteoporosis in her mother; Thyroid disease in her daughter.  She  reports that she quit smoking about 18 years ago. Her smoking use included cigarettes. She started smoking about 21 years ago. She has a 20.00 pack-year smoking history. She has never used smokeless tobacco. She reports that she drinks about 1.2 - 2.4 oz of alcohol per week. She reports that she does not use drugs.  She has No Known Allergies..    Outpatient Medications Prior to Visit   Medication Sig Dispense Refill   • Black Cohosh 40 MG capsule Take  by mouth.     • Cholecalciferol (VITAMIN D) 1000 units tablet Vitamin D     • Glucosamine Sulfate 500 MG tablet glucosamine sulfate 500 mg tablet     • Omega-3 Fatty Acids (FISH OIL) 1000 MG capsule capsule Fish Oil     • ASPIRIN 81 PO aspirin     • celecoxib (CeleBREX) 200 MG capsule Take 1 capsule by mouth Daily. 90 capsule 1   • pravastatin (PRAVACHOL) 80 MG tablet Take 0.5 tablets by mouth Every Night. 90 tablet 1   • propranolol LA (INDERAL LA) 120 MG 24 hr capsule Take 1 capsule by mouth Daily. 90 capsule 1   • SUMAtriptan (IMITREX) 50 MG tablet Take 1 tablet by mouth 1 (One) Time As Needed for Migraine for up to 1 dose. May repeat dose once in 2 hours if needed 27 tablet 1   • venlafaxine XR (EFFEXOR-XR) 75 MG 24 hr capsule Take 1 capsule by mouth Daily. 90 capsule 1     No  "facility-administered medications prior to visit.        Patient Active Problem List   Diagnosis   • Migraine without aura and without status migrainosus, not intractable   • Pure hypercholesterolemia   • Primary osteoarthritis involving multiple joints   • Hot flashes due to menopause   • Bilateral hearing loss   • Chronic rhinitis       Advanced Care Planning:  Patient has an advance directive - a copy has not been provided. Have asked the patient to send this to us to add to record    Review of Systems   HENT: Positive for hearing loss.    Eyes: Negative for visual disturbance.   Respiratory: Negative for cough.    Cardiovascular: Negative for chest pain.   Gastrointestinal: Negative for abdominal pain, constipation and diarrhea.   Genitourinary: Negative for pelvic pain.        No breast pain, lumps or nipple discharge   Musculoskeletal: Positive for arthralgias.   Neurological: Positive for headaches.   Psychiatric/Behavioral: Negative for dysphoric mood.       Compared to one year ago, the patient feels her physical health is the same.  Compared to one year ago, the patient feels her mental health is the same.    Reviewed chart for potential of high risk medication in the elderly: not applicable  Reviewed chart for potential of harmful drug interactions in the elderly:not applicable    Objective         Vitals:    08/29/19 0934   BP: 126/78   BP Location: Right arm   Patient Position: Sitting   Cuff Size: Large Adult   Pulse: 56   SpO2: 97%   Weight: 98.1 kg (216 lb 3.2 oz)   Height: 157.5 cm (62\")   PainSc: 0-No pain       Body mass index is 39.54 kg/m².  Discussed the patient's BMI with her. The BMI is above average; BMI management plan is completed.    Patient's Body mass index is 39.54 kg/m². BMI is above normal parameters. Recommendations include: exercise counseling.    Physical Exam   Constitutional: She is oriented to person, place, and time. Vital signs are normal. No distress.   HENT:   Right Ear: " Tympanic membrane and ear canal normal.   Left Ear: Tympanic membrane and ear canal normal.   Nose: Nose normal.   Mouth/Throat: Oropharynx is clear and moist and mucous membranes are normal. No oral lesions.   Hearing aids   Eyes: Right eye exhibits no discharge. Left eye exhibits no discharge.   Neck: Neck supple. No thyromegaly present.   Cardiovascular: Normal rate, regular rhythm and normal heart sounds.   No murmur heard.  Pulmonary/Chest: Effort normal and breath sounds normal.   Abdominal: Soft. Bowel sounds are normal. There is no tenderness.   Musculoskeletal: She exhibits no edema.   Lymphadenopathy:        Head (right side): No submandibular, no preauricular and no posterior auricular adenopathy present.        Head (left side): No submandibular, no preauricular and no posterior auricular adenopathy present.     She has no cervical adenopathy.   Neurological: She is alert and oriented to person, place, and time.   Skin: Skin is warm and dry.   Numerous seborrheic keratoses, cherry angiomas and nevi.   Psychiatric: She has a normal mood and affect. Her behavior is normal. Judgment and thought content normal.             Assessment/Plan   Medicare Risks and Personalized Health Plan  CMS Preventative Services Quick Reference  Advance Directive Discussion  Breast Cancer/Mammogram Screening  Colon Cancer Screening  Diabetic Lab Screening   Immunizations Discussed/Encouraged (specific immunizations; Influenza and Prevnar )  Obesity/Overweight   Osteoprorosis Risk    Counseled on health maintenance topics and preventative care recommendations: Influenza vaccine, pneumonia vaccine, Shingrix vaccine, tetanus vaccine, DEXA bone density screening, mammogram breast cancer screening    The above risks/problems have been discussed with the patient.  Pertinent information has been shared with the patient in the After Visit Summary.  Follow up plans and orders are seen below in the Assessment/Plan Section.    Diagnoses  and all orders for this visit:    1. Well adult exam (Primary)  Prior colonoscopy records requested.  Prior PCP records requested.  Patient is unsure when her last bone density was performed, will verify with prior records and if it is been more than 2 years plan to order a bone density test.  2. Flu vaccine need  -     Fluad Tri 65yr ([id number])    3. Need for pneumococcal vaccine  -     Pneumococcal Conjugate Vaccine 13-Valent All (PCV13)    4. Screening for breast cancer  -     Mammo Screening Digital Tomosynthesis Bilateral With CAD; Future    5. Pure hypercholesterolemia  -     pravastatin (PRAVACHOL) 80 MG tablet; Take 0.5 tablets by mouth Every Night.  Dispense: 90 tablet; Refill: 3  -     Comprehensive Metabolic Panel; Future  -     Lipid Panel; Future  Check fasting labs today.  Continue statins.  6. Primary osteoarthritis involving multiple joints  -     celecoxib (CeleBREX) 200 MG capsule; Take 1 capsule by mouth Daily.  Dispense: 90 capsule; Refill: 3  Continue Celebrex for pain.  7. Migraine without aura and without status migrainosus, not intractable  -     SUMAtriptan (IMITREX) 50 MG tablet; Take 1 tablet by mouth 1 (One) Time As Needed for Migraine for up to 1 dose. May repeat dose once in 2 hours if needed  Dispense: 27 tablet; Refill: 3  -     propranolol LA (INDERAL LA) 120 MG 24 hr capsule; Take 1 capsule by mouth Daily.  Dispense: 90 capsule; Refill: 3  -     venlafaxine XR (EFFEXOR-XR) 75 MG 24 hr capsule; Take 1 capsule by mouth Daily.  Dispense: 90 capsule; Refill: 3  Continue venlafaxine and propranolol for prevention.  Excedrin Migraine or Imitrex as needed during migraine.  8. Screening for diabetes mellitus  -     Comprehensive Metabolic Panel; Future    9. Screening for deficiency anemia  -     CBC & Differential; Future    10. Screening for thyroid disorder  -     TSH Rfx On Abnormal To Free T4; Future    11. Vitamin D deficiency  -     Vitamin D 25 Hydroxy; Future        Follow  Up:  Return in about 1 year (around 8/29/2020) for Medicare Wellness.     An After Visit Summary and PPPS were given to the patient.

## 2019-08-29 NOTE — PATIENT INSTRUCTIONS
Recombinant Zoster (Shingles) Vaccine, RZV: What You Need to Know  1. Why get vaccinated?  Shingles (also called herpes zoster, or just zoster) is a painful skin rash, often with blisters. Shingles is caused by the varicella zoster virus, the same virus that causes chickenpox. After you have chickenpox, the virus stays in your body and can cause shingles later in life.  You can't catch shingles from another person. However, a person who has never had chickenpox (or chickenpox vaccine) could get chickenpox from someone with shingles.  A shingles rash usually appears on one side of the face or body and heals within 2 to 4 weeks. Its main symptom is pain, which can be severe. Other symptoms can include fever, headache, chills and upset stomach. Very rarely, a shingles infection can lead to pneumonia, hearing problems, blindness, brain inflammation (encephalitis), or death.  For about 1 person in 5, severe pain can continue even long after the rash has cleared up. This long-lasting pain is called post-herpetic neuralgia (PHN).  Shingles is far more common in people 50 years of age and older than in younger people, and the risk increases with age. It is also more common in people whose immune system is weakened because of a disease such as cancer, or by drugs such as steroids or chemotherapy.  At least 1 million people a year in the [place] get shingles.  2. Shingles vaccine (recombinant)  Recombinant shingles vaccine was approved by FDA in 2017 for the prevention of shingles. In clinical trials, it was more than 90% effective in preventing shingles. It can also reduce the likelihood of PHN.  Two doses, 2 to 6 months apart, are recommended for adults 50 and older.  This vaccine is also recommended for people who have already gotten the live shingles vaccine (Zostavax). There is no live virus in this vaccine.  3. Some people should not get this vaccine  Tell your vaccine provider if you:  · Have any severe,  life-threatening allergies. A person who has ever had a life-threatening allergic reaction after a dose of recombinant shingles vaccine, or has a severe allergy to any component of this vaccine, may be advised not to be vaccinated. Ask your health care provider if you want information about vaccine components.  · Are pregnant or breastfeeding. There is not much information about use of recombinant shingles vaccine in pregnant or nursing women. Your healthcare provider might recommend delaying vaccination.  · Are not feeling well. If you have a mild illness, such as a cold, you can probably get the vaccine today. If you are moderately or severely ill, you should probably wait until you recover. Your doctor can advise you.  4. Risks of a vaccine reaction  With any medicine, including vaccines, there is a chance of reactions.  After recombinant shingles vaccination, a person might experience:  · Pain, redness, soreness, or swelling at the site of the injection  · Headache, muscle aches, fever, shivering, fatigue  In clinical trials, most people got a sore arm with mild or moderate pain after vaccination, and some also had redness and swelling where they got the shot. Some people felt tired, had muscle pain, a headache, shivering, fever, stomach pain, or nausea. About 1 out of 6 people who got recombinant zoster vaccine experienced side effects that prevented them from doing regular activities. Symptoms went away on their own in about 2 to 3 days. Side effects were more common in younger people.  You should still get the second dose of recombinant zoster vaccine even if you had one of these reactions after the first dose.  Other things that could happen after this vaccine:  · People sometimes faint after medical procedures, including vaccination. Sitting or lying down for about 15 minutes can help prevent fainting and injuries caused by a fall. Tell your provider if you feel dizzy or have vision changes or ringing in  the ears.  · Some people get shoulder pain that can be more severe and longer-lasting than routine soreness that can follow injections. This happens very rarely.  · Any medication can cause a severe allergic reaction. Such reactions to a vaccine are estimated at about 1 in a million doses, and would happen within a few minutes to a few hours after the vaccination.  As with any medicine, there is a very remote chance of a vaccine causing a serious injury or death.  The safety of vaccines is always being monitored. For more information, visit: www.cdc.gov/vaccinesafety/  5. What if there is a serious problem?  What should I look for?  · Look for anything that concerns you, such as signs of a severe allergic reaction, very high fever, or unusual behavior.  Signs of a severe allergic reaction can include hives, swelling of the face and throat, difficulty breathing, a fast heartbeat, dizziness, and weakness. These would usually start a few minutes to a few hours after the vaccination.  What should I do?  · If you think it is a severe allergic reaction or other emergency that can't wait, call 9-1-1 and get to the nearest hospital. Otherwise, call your health care provider.  Afterward, the reaction should be reported to the Vaccine Adverse Event Reporting System (VAERS). Your doctor should file this report, or you can do it yourself through the VAERS web site atwww.[place].[facility].govor by calling 1-428.158.4934.  VAERS does not give medical advice.  6. How can I learn more?  · Ask your healthcare provider. He or she can give you the vaccine package insert or suggest other sources of information.  · Call your local or state health department.  · Contact the Centers for Disease Control and Prevention (CDC):  ? Call 1-449.842.4295 (6-733-FEA-INFO) or  ? Visit the CDC's website at www.cdc.gov/vaccines  CDC Vaccine Information Statement (VIS) Recombinant Zoster Vaccine (02/12/2018)  This information is not intended to replace advice  given to you by your health care provider. Make sure you discuss any questions you have with your health care provider.  Document Released: 2018 Document Revised: 2018 Document Reviewed: 2018  [employer] Interactive Patient Education © 2019 [employer]    Tdap Vaccine (Tetanus, Diphtheria and Pertussis): What You Need To Know  1. Why get vaccinated?  Tetanus, diphtheria and pertussis are very serious diseases. Tdap vaccine can protect us from these diseases. And, Tdap vaccine given to pregnant women can protect  babies against pertussis..  TETANUS (Lockjaw) is rare in the [place] today. It causes painful muscle tightening and stiffness, usually all over the body.  · It can lead to tightening of muscles in the head and neck so you can't open your mouth, swallow, or sometimes even breathe. Tetanus kills about 1 out of 10 people who are infected even after receiving the best medical care.  DIPHTHERIA is also rare in the [place] today. It can cause a thick coating to form in the back of the throat.  · It can lead to breathing problems, heart failure, paralysis, and death.  PERTUSSIS (Whooping Cough) causes severe coughing spells, which can cause difficulty breathing, vomiting and disturbed sleep.  · It can also lead to weight loss, incontinence, and rib fractures. Up to 2 in 100 adolescents and 5 in 100 adults with pertussis are hospitalized or have complications, which could include pneumonia or death.  These diseases are caused by bacteria. Diphtheria and pertussis are spread from person to person through secretions from coughing or sneezing. Tetanus enters the body through cuts, scratches, or wounds.  Before vaccines, as many as 200,000 cases of diphtheria, 200,000 cases of pertussis, and hundreds of cases of tetanus, were reported in the [place] each year. Since vaccination began, reports of cases for tetanus and diphtheria have dropped by about 99% and for pertussis by  about 80%.  2. Tdap vaccine  Tdap vaccine can protect adolescents and adults from tetanus, diphtheria, and pertussis. One dose of Tdap is routinely given at age 11 or 12. People who did not get Tdap at that age should get it as soon as possible.  Tdap is especially important for healthcare professionals and anyone having close contact with a baby younger than 12 months.  Pregnant women should get a dose of Tdap during every pregnancy, to protect the  from pertussis. Infants are most at risk for severe, life-threatening complications from pertussis.  Another vaccine, called Td, protects against tetanus and diphtheria, but not pertussis. A Td booster should be given every 10 years. Tdap may be given as one of these boosters if you have never gotten Tdap before. Tdap may also be given after a severe cut or burn to prevent tetanus infection.  Your doctor or the person giving you the vaccine can give you more information.  Tdap may safely be given at the same time as other vaccines.  3. Some people should not get this vaccine  · A person who has ever had a life-threatening allergic reaction after a previous dose of any diphtheria, tetanus or pertussis containing vaccine, OR has a severe allergy to any part of this vaccine, should not get Tdap vaccine. Tell the person giving the vaccine about any severe allergies.  · Anyone who had coma or long repeated seizures within 7 days after a childhood dose of DTP or DTaP, or a previous dose of Tdap, should not get Tdap, unless a cause other than the vaccine was found. They can still get Td.  · Talk to your doctor if you:  ? have seizures or another nervous system problem,  ? had severe pain or swelling after any vaccine containing diphtheria, tetanus or pertussis,  ? ever had a condition called Guillain-Barré Syndrome (GBS),  ? aren't feeling well on the day the shot is scheduled.  4. Risks  With any medicine, including vaccines, there is a chance of side effects. These  are usually mild and go away on their own. Serious reactions are also possible but are rare.  Most people who get Tdap vaccine do not have any problems with it.  Mild problems following Tdap:  (Did not interfere with activities)  · Pain where the shot was given (about 3 in 4 adolescents or 2 in 3 adults)  · Redness or swelling where the shot was given (about 1 person in 5)  · Mild fever of at least 100.4°F (up to about 1 in 25 adolescents or 1 in 100 adults)  · Headache (about 3 or 4 people in 10)  · Tiredness (about 1 person in 3 or 4)  · Nausea, vomiting, diarrhea, stomach ache (up to 1 in 4 adolescents or 1 in 10 adults)  · Chills, sore joints (about 1 person in 10)  · Body aches (about 1 person in 3 or 4)  · Rash, swollen glands (uncommon)  Moderate problems following Tdap:  (Interfered with activities, but did not require medical attention)  · Pain where the shot was given (up to 1 in 5 or 6)  · Redness or swelling where the shot was given (up to about 1 in 16 adolescents or 1 in 12 adults)  · Fever over 102°F (about 1 in 100 adolescents or 1 in 250 adults)  · Headache (about 1 in 7 adolescents or 1 in 10 adults)  · Nausea, vomiting, diarrhea, stomach ache (up to 1 or 3 people in 100)  · Swelling of the entire arm where the shot was given (up to about 1 in 500).  Severe problems following Tdap:  (Unable to perform usual activities; required medical attention)  · Swelling, severe pain, bleeding and redness in the arm where the shot was given (rare).  Problems that could happen after any vaccine:  · People sometimes faint after a medical procedure, including vaccination. Sitting or lying down for about 15 minutes can help prevent fainting, and injuries caused by a fall. Tell your doctor if you feel dizzy, or have vision changes or ringing in the ears.  · Some people get severe pain in the shoulder and have difficulty moving the arm where a shot was given. This happens very rarely.  · Any medication can cause a  severe allergic reaction. Such reactions from a vaccine are very rare, estimated at fewer than 1 in a million doses, and would happen within a few minutes to a few hours after the vaccination.  As with any medicine, there is a very remote chance of a vaccine causing a serious injury or death.  The safety of vaccines is always being monitored. For more information, visit: www.cdc.gov/vaccinesafety/  5. What if there is a serious problem?  What should I look for?  · Look for anything that concerns you, such as signs of a severe allergic reaction, very high fever, or unusual behavior.  Signs of a severe allergic reaction can include hives, swelling of the face and throat, difficulty breathing, a fast heartbeat, dizziness, and weakness. These would usually start a few minutes to a few hours after the vaccination.  What should I do?  · If you think it is a severe allergic reaction or other emergency that can’t wait, call 9-1-1 or get the person to the nearest hospital. Otherwise, call your doctor.  · Afterward, the reaction should be reported to the Vaccine Adverse Event Reporting System (VAERS). Your doctor might file this report, or you can do it yourself through the VAERS web site at www.vaers.[facility].gov, or by calling 1-398.364.2676.  ? [employer] does not give medical advice.  6. The National Vaccine Injury Compensation Program  The National Vaccine Injury Compensation Program (VICP) is a federal program that was created to compensate people who may have been injured by certain vaccines.  Persons who believe they may have been injured by a vaccine can learn about the program and about filing a claim by calling 1-293.898.8471 or visiting the VICP website at www.hrsa.gov/vaccinecompensation. There is a time limit to file a claim for compensation.  7. How can I learn more?  · Ask your doctor. He or she can give you the vaccine package insert or suggest other sources of information.  · Call your [facility] or [facility]  department.  · Contact the Centers for Disease Control and Prevention (CDC):  ? Call 1-925.233.3892 (8-379-TDE-INFO) or  ? Visit CDC’s website at www.cdc.gov/vaccines  CDC Vaccine Information Statement Tdap Vaccine (2/24/15)  This information is not intended to replace advice given to you by your health care provider. Make sure you discuss any questions you have with your health care provider.  Document Released: 2013 Document Revised: 2018 Document Reviewed: 2018  [employer] Interactive Patient Education © 2019 [employer]        Medicare Wellness  Personal Prevention Plan of Service     Date of Office Visit:  2019  Encounter Provider:  Raven Douglas MD  Place of Service:  [facility]  Patient Name: Gavi Nye  :  1945    As part of the Medicare Wellness portion of your visit today, we are providing you with this personalized preventive plan of services (PPPS). This plan is based upon recommendations of the [place] Preventive Services Task Force (USPSTF) and the Advisory Committee on Immunization Practices (ACIP).    This lists the preventive care services that should be considered, and provides dates of when you are due. Items listed as completed are up-to-date and do not require any further intervention.    Health Maintenance   Topic Date Due   • TDAP/TD VACCINES (1 - Tdap) 1962   • ZOSTER VACCINE (1 of 2) 1993   • COLONOSCOPY  2018   • LIPID PANEL  2020   • MEDICARE ANNUAL WELLNESS  2020   • PNEUMOCOCCAL VACCINES (65+ LOW/MEDIUM RISK) (2 of 2 - PPSV23) 2020   • INFLUENZA VACCINE  Completed       Orders Placed This Encounter   Procedures   • Mammo Screening Digital Tomosynthesis Bilateral With CAD     Standing Status:   Future     Standing Expiration Date:   2020     Order Specific Question:   Reason for Exam:     Answer:   screen   • Fluad Tri 65yr (5625-2565)   • Pneumococcal Conjugate  Vaccine 13-Valent All (PCV13)   • Comprehensive Metabolic Panel     Standing Status:   Future     Standing Expiration Date:   8/29/2020   • Lipid Panel     Standing Status:   Future     Standing Expiration Date:   8/29/2020   • TSH Rfx On Abnormal To Free T4     Standing Status:   Future     Standing Expiration Date:   8/29/2020   • Vitamin D 25 Hydroxy     Standing Status:   Future     Standing Expiration Date:   8/29/2020   • CBC & Differential     Standing Status:   Future     Standing Expiration Date:   8/29/2020     Order Specific Question:   Manual Differential     Answer:   No       Return in about 1 year (around 8/29/2020) for Medicare Wellness.

## 2019-08-30 ENCOUNTER — TELEPHONE (OUTPATIENT)
Dept: FAMILY MEDICINE CLINIC | Facility: CLINIC | Age: 74
End: 2019-08-30

## 2019-08-30 DIAGNOSIS — R73.01 ELEVATED FASTING GLUCOSE: Primary | ICD-10-CM

## 2019-08-30 DIAGNOSIS — R73.03 PREDIABETES: Primary | ICD-10-CM

## 2019-08-30 PROBLEM — E78.1 HYPERTRIGLYCERIDEMIA: Status: ACTIVE | Noted: 2019-08-29

## 2019-08-30 LAB — HBA1C MFR BLD: 5.7 % (ref 4.8–5.6)

## 2019-08-30 NOTE — TELEPHONE ENCOUNTER
Contacted PT and alerted her of her lab results. She verbalized understanding and stated she would be interested in meeting with a dietitian, please place order

## 2019-08-30 NOTE — TELEPHONE ENCOUNTER
----- Message from Raven Douglas MD sent at 8/30/2019  1:59 PM EDT -----  The test results show that your sugar is mildly elevated in the prediabetes range.  I recommend that you reduce sugar, carbohydrates, and starches in your diet.  If you are interested I can refer you to dietitian for further information.  Kidney function is good.  Normal electrolytes.  Normal liver function.  Cholesterol is okay but triglycerides are elevated.  I recommend reducing fried and fatty foods in your diet.  Thyroid function is normal.  Normal vitamin D level.  Normal white blood cell count.  No anemia.

## 2019-08-30 NOTE — TELEPHONE ENCOUNTER
Notes recorded by Raven Okeefe MD on 8/30/2019 at 1:59 PM EDT  The test results show that your sugar is mildly elevated in the prediabetes range.  I recommend that you reduce sugar, carbohydrates, and starches in your diet.  If you are interested I can refer you to dietitian for further information.  Kidney function is good.  Normal electrolytes.  Normal liver function.  Cholesterol is okay but triglycerides are elevated.  I recommend reducing fried and fatty foods in your diet.  Thyroid function is normal.  Normal vitamin D level.  Normal white blood cell count.  No anemia.

## 2019-09-19 ENCOUNTER — APPOINTMENT (OUTPATIENT)
Dept: NUTRITION | Facility: HOSPITAL | Age: 74
End: 2019-09-19

## 2019-09-20 ENCOUNTER — TELEPHONE (OUTPATIENT)
Dept: FAMILY MEDICINE CLINIC | Facility: CLINIC | Age: 74
End: 2019-09-20

## 2020-05-19 ENCOUNTER — PRIOR AUTHORIZATION (OUTPATIENT)
Dept: FAMILY MEDICINE CLINIC | Facility: CLINIC | Age: 75
End: 2020-05-19

## 2020-05-26 ENCOUNTER — TELEPHONE (OUTPATIENT)
Dept: FAMILY MEDICINE CLINIC | Facility: CLINIC | Age: 75
End: 2020-05-26

## 2020-05-26 DIAGNOSIS — G43.009 MIGRAINE WITHOUT AURA AND WITHOUT STATUS MIGRAINOSUS, NOT INTRACTABLE: ICD-10-CM

## 2020-05-26 RX ORDER — SUMATRIPTAN SUCCINATE 50 MG
50 TABLET ORAL AS NEEDED
Qty: 27 TABLET | Refills: 3 | Status: SHIPPED | OUTPATIENT
Start: 2020-05-26 | End: 2021-04-27 | Stop reason: SDUPTHER

## 2020-05-26 NOTE — TELEPHONE ENCOUNTER
Patient's  walked in to the office today requesting that we contact the insurance company to alert them that only bran name Imitrex can be used, no generic

## 2020-05-26 NOTE — TELEPHONE ENCOUNTER
Unsure what is needed. PA? Or refill?     Refill sent for brand name Imitrex. Will likely be rejected and require a PA.     LM at #[phone] for pt to call back. Why is brand name necessary?

## 2020-06-01 ENCOUNTER — TELEPHONE (OUTPATIENT)
Dept: FAMILY MEDICINE CLINIC | Facility: CLINIC | Age: 75
End: 2020-06-01

## 2020-06-01 NOTE — TELEPHONE ENCOUNTER
Contacted patient to retrieve more details from insurance card    BIN [id number]  PCN [id number]  [facility] [id number]      Completed  PA for Imitrex 50 mg  KEY: [id number]  Awaiting determination from insurance plan

## 2020-06-05 NOTE — TELEPHONE ENCOUNTER
PA was submitted to incorrect pharmacy plan.     Resubmitted to [facility].   Key - [id number]  Awaiting determination

## 2021-04-27 ENCOUNTER — OFFICE VISIT (OUTPATIENT)
Dept: FAMILY MEDICINE CLINIC | Facility: CLINIC | Age: 76
End: 2021-04-27

## 2021-04-27 VITALS
HEART RATE: 71 BPM | WEIGHT: 216.2 LBS | SYSTOLIC BLOOD PRESSURE: 128 MMHG | DIASTOLIC BLOOD PRESSURE: 88 MMHG | HEIGHT: 62 IN | BODY MASS INDEX: 39.79 KG/M2 | OXYGEN SATURATION: 93 %

## 2021-04-27 DIAGNOSIS — G43.009 MIGRAINE WITHOUT AURA AND WITHOUT STATUS MIGRAINOSUS, NOT INTRACTABLE: ICD-10-CM

## 2021-04-27 DIAGNOSIS — E78.00 PURE HYPERCHOLESTEROLEMIA: ICD-10-CM

## 2021-04-27 DIAGNOSIS — M25.511 CHRONIC PAIN OF BOTH SHOULDERS: Primary | ICD-10-CM

## 2021-04-27 DIAGNOSIS — M25.512 CHRONIC PAIN OF BOTH SHOULDERS: Primary | ICD-10-CM

## 2021-04-27 DIAGNOSIS — G89.29 CHRONIC PAIN OF BOTH SHOULDERS: Primary | ICD-10-CM

## 2021-04-27 DIAGNOSIS — G89.29 CHRONIC MIDLINE LOW BACK PAIN, UNSPECIFIED WHETHER SCIATICA PRESENT: ICD-10-CM

## 2021-04-27 DIAGNOSIS — M54.50 CHRONIC MIDLINE LOW BACK PAIN, UNSPECIFIED WHETHER SCIATICA PRESENT: ICD-10-CM

## 2021-04-27 DIAGNOSIS — R20.0 HAND NUMBNESS: ICD-10-CM

## 2021-04-27 DIAGNOSIS — M15.9 PRIMARY OSTEOARTHRITIS INVOLVING MULTIPLE JOINTS: ICD-10-CM

## 2021-04-27 PROBLEM — H91.93 BILATERAL HEARING LOSS: Chronic | Status: ACTIVE | Noted: 2018-08-20

## 2021-04-27 PROCEDURE — 99214 OFFICE O/P EST MOD 30 MIN: CPT | Performed by: FAMILY MEDICINE

## 2021-04-27 RX ORDER — PRAVASTATIN SODIUM 80 MG/1
80 TABLET ORAL NIGHTLY
Qty: 90 TABLET | Refills: 0 | Status: SHIPPED | OUTPATIENT
Start: 2021-04-27 | End: 2021-06-10 | Stop reason: SDUPTHER

## 2021-04-27 RX ORDER — PRAVASTATIN SODIUM 80 MG/1
40 TABLET ORAL NIGHTLY
Qty: 90 TABLET | Refills: 0 | Status: SHIPPED | OUTPATIENT
Start: 2021-04-27 | End: 2021-04-27 | Stop reason: SDUPTHER

## 2021-04-27 RX ORDER — CELECOXIB 200 MG/1
200 CAPSULE ORAL DAILY
Qty: 90 CAPSULE | Refills: 0 | Status: SHIPPED | OUTPATIENT
Start: 2021-04-27 | End: 2021-06-04

## 2021-04-27 RX ORDER — SUMATRIPTAN SUCCINATE 50 MG
50 TABLET ORAL AS NEEDED
Qty: 27 TABLET | Refills: 0 | Status: SHIPPED | OUTPATIENT
Start: 2021-04-27 | End: 2021-11-10 | Stop reason: SDUPTHER

## 2021-04-27 RX ORDER — PREDNISONE 20 MG/1
TABLET ORAL
Qty: 19 TABLET | Refills: 0 | Status: SHIPPED | OUTPATIENT
Start: 2021-04-27 | End: 2021-06-10 | Stop reason: SDUPTHER

## 2021-04-27 RX ORDER — PROPRANOLOL HYDROCHLORIDE 120 MG/1
120 CAPSULE, EXTENDED RELEASE ORAL DAILY
Qty: 90 CAPSULE | Refills: 0 | Status: SHIPPED | OUTPATIENT
Start: 2021-04-27 | End: 2021-06-10 | Stop reason: SDUPTHER

## 2021-04-27 NOTE — PROGRESS NOTES
"Chief Complaint  Arthritis (patient reports that she is constantly in pain, states it is worse in arms, has tingling, )    Subjective          Gavi Nye presents to [facility]  History of Present Illness     Constant pain. She only sleeps an hour at a time. Legs, hands, or arms wake her up. Right hand goes to sleep constantly. Needles in her hands. She likes to read bog book, makes her hands really go to sleep. She has trouble raising her hands above her head and fixing her hair. She wants to avoid addicting pain medication. She gets in hot tub and it feels good. She has had prior rotator cuff surgery. Low back pain with vacuuming.     Patient Kristofer and Yoav also sent a letter with her to the appointment.  He reports that she did not seek medical advice because \"all they will do is give me pills\".  She has not been in for a while because she does not want to hear that she needs to lose weight.  See scanned documents for full letter.    Objective   Vital Signs:   /88 (BP Location: Left arm, Patient Position: Sitting, Cuff Size: Adult)   Pulse 71   Ht 157.5 cm (62\")   Wt 98.1 kg (216 lb 3.2 oz)   SpO2 93%   BMI 39.54 kg/m²     Physical Exam  Vitals reviewed.   Constitutional:       General: She is not in acute distress.     Appearance: She is obese. She is not ill-appearing.   HENT:      Right Ear: Decreased hearing noted.      Left Ear: Decreased hearing noted.   Cardiovascular:      Rate and Rhythm: Normal rate and regular rhythm.   Pulmonary:      Effort: Pulmonary effort is normal.      Breath sounds: Normal breath sounds.   Musculoskeletal:      Comments: Difficulty raising her arms above shoulders.  Bilateral wrist normal range of motion without swelling or pain.  Negative Tinel's sign bilaterally.   strength intact.   Neurological:      Mental Status: She is alert.      Gait: Gait normal.   Psychiatric:         Mood and Affect: Mood normal.        Result " Review :                 Assessment and Plan    Diagnoses and all orders for this visit:    1. Chronic pain of both shoulders (Primary)  -     predniSONE (DELTASONE) 20 MG tablet; Take 2 tablets by mouth Daily for 7 days, THEN 1 tablet Daily for 3 days, THEN 0.5 tablets Daily for 3 days.  Dispense: 19 tablet; Refill: 0  New.  Treat with prednisone burst.  Patient has been taking Celebrex without relief.  Start physical therapy.  2. Hand numbness  -     predniSONE (DELTASONE) 20 MG tablet; Take 2 tablets by mouth Daily for 7 days, THEN 1 tablet Daily for 3 days, THEN 0.5 tablets Daily for 3 days.  Dispense: 19 tablet; Refill: 0  New.  Treat with prednisone burst.  Patient has been taking Celebrex without relief.  Start physical therapy.  3. Primary osteoarthritis involving multiple joints  -     celecoxib (CeleBREX) 200 MG capsule; Take 1 capsule by mouth Daily.  Dispense: 90 capsule; Refill: 0  New.  Treat with prednisone burst.  Patient has been taking Celebrex without relief.  Start physical therapy.  4. Migraine without aura and without status migrainosus, not intractable  -     Imitrex 50 MG tablet; Take 1 tablet by mouth As Needed for Migraine for up to 1 dose. May repeat dose once in 2 hours if needed  Dispense: 27 tablet; Refill: 0  -     propranolol LA (INDERAL LA) 120 MG 24 hr capsule; Take 1 capsule by mouth Daily.  Dispense: 90 capsule; Refill: 0  Continue chronic meds.  Reassess at her wellness visit.  5. Pure hypercholesterolemia  -     pravastatin (PRAVACHOL) 80 MG tablet; Take 1 tablet by mouth Every Night.  Dispense: 90 tablet; Refill: 0  Continue chronic meds.  Reassess at her wellness visit.  6. Chronic midline low back pain, unspecified whether sciatica present  -     Ambulatory Referral to Physical Therapy Evaluate and treat  New.  Treat with prednisone burst.  Patient has been taking Celebrex without relief.  Start physical therapy.      Follow Up   Return in 1 month (on 5/27/2021) for Medicare  Wellness.  Patient was given instructions and counseling regarding her condition or for health maintenance advice. Please see specific information pulled into the AVS if appropriate.     Electronically signed by Raven Douglas MD, 04/27/21, 11:05 AM EDT.

## 2021-05-21 ENCOUNTER — TELEPHONE (OUTPATIENT)
Dept: FAMILY MEDICINE CLINIC | Facility: CLINIC | Age: 76
End: 2021-05-21

## 2021-05-24 ENCOUNTER — PRIOR AUTHORIZATION (OUTPATIENT)
Dept: FAMILY MEDICINE CLINIC | Facility: CLINIC | Age: 76
End: 2021-05-24

## 2021-05-24 NOTE — TELEPHONE ENCOUNTER
(Key: [id number]) - [id number]  Imitrex 50MG tablets  Status: sent to plan  Created: May 22nd, 2021 [phone]  Sent: May 24th, 2021    Approved today  CaseId:[id number];Status:Approved;Review Type:Prior Auth;Coverage Start Date:04/24/2021;Coverage End Date:05/24/2022;;CaseId:[id number];Status:Approved;Review Type:Qty;Coverage Start Date:04/24/2021;Coverage End Date:05/24/2022;

## 2021-05-24 NOTE — TELEPHONE ENCOUNTER
She is on the maximum dose of Celebrex.  Other medications I could prescribe for pain would be diclofenac or meloxicam that we would have to switch and cannot be taken in combination with Celebrex.  She needs to schedule a follow-up visit with me to discuss her arthritis pain.

## 2021-05-24 NOTE — TELEPHONE ENCOUNTER
Spoke with patient and . I relayed PCP's message, she declined scheduling an upcoming appt. They are leaving town for [place] in 2 days. I advised her to either schedule with another provider or be evaluated at [facility].     They both declined and thanked us for the call.

## 2021-05-24 NOTE — TELEPHONE ENCOUNTER
READ THE NOTE DR DOWNEY LEFT.  SAID PATIENT IS IN A LOT OF PAIN. REQUESTING WE CALL IN SOMETHING TO HELP HER.     [facility] [address]    REQUESTING A CALL

## 2021-06-02 ENCOUNTER — TELEPHONE (OUTPATIENT)
Dept: FAMILY MEDICINE CLINIC | Facility: CLINIC | Age: 76
End: 2021-06-02

## 2021-06-02 NOTE — TELEPHONE ENCOUNTER
Caller: Yoav Nye    Relationship: Emergency Contact    Best call back number: [phone]    What is the best time to reach you: ANYTIME     Who are you requesting to speak with (clinical staff, provider,  specific staff member): CLINICAL STAFF     What was the call regarding: PATIENT WAS SEEN BY PHYSICAL THERAPY AND WAS TOLD THAT SHE WAS WAISTING THEIR TIME BECAUSE SHE IS UNABLE TO PERFORM THE TASKS THAT ARE NEEDED TO BE DONE. THEY ARE ASKING THAT THE PATIENT EITHER BE SEEN BY HER PROVIDER SOONER OR HAVE MEDICATION TO HELP WITH THE PAIN.     Do you require a callback: YES

## 2021-06-03 NOTE — TELEPHONE ENCOUNTER
I am not comfortable prescribing narcotics.  I could switch her to a different anti-inflammatory.  Which she like to try diclofenac or meloxicam?

## 2021-06-03 NOTE — TELEPHONE ENCOUNTER
Caller: Gavi Nye    Relationship: Self    Best call back number: [phone]    What medication are you requesting: PREDNISONE AND AN ANTIINFLAMMATORY     What are your current symptoms: OSTEOARTHRITIS    How long have you been experiencing symptoms: N/A    Have you had these symptoms before:    [x] Yes  [] No    Have you been treated for these symptoms before:   [x] Yes  [] No    If a prescription is needed, what is your preferred pharmacy and phone number: [id number] [place] [address] [address] - [phone]  - [phone] FX     Additional notes: THE PATIENT STATED SHE IS HAVING PROBLEMS WITH HER OSTEOARTHRITIS AND THE PHYSICAL THERAPIST TOLD THE PATIENT NEEDS TO HAVE MORE PREDNISONE AND AN ANTIINFLAMMATORY TO HELP WITH THE PAIN.

## 2021-06-03 NOTE — TELEPHONE ENCOUNTER
Attempted to contact, no answer LVM relaying PCP's message. Asking for return call to discuss which type of anti inflammatory she is will to try to help manage her pain ?   Diclofenac or Meloxicam?

## 2021-06-03 NOTE — TELEPHONE ENCOUNTER
CALLED SAYING THEY ARE NOT ASKING FOR A NARCOTIC. PHYSICAL THERAPY WILL NOT HELP HER UNTIL SHE IS TAKING AN INFLAMMATORY. THE PREDNISONE IS THE LAST THING SHE TOOK AND SEEMS TO HELP HER.     REQUESTING A CALL FROM DR DOWNEY TO DISCUSS.

## 2021-06-04 RX ORDER — DICLOFENAC SODIUM 75 MG/1
75 TABLET, DELAYED RELEASE ORAL 2 TIMES DAILY
Qty: 60 TABLET | Refills: 5 | Status: SHIPPED | OUTPATIENT
Start: 2021-06-04 | End: 2021-07-09

## 2021-06-04 NOTE — TELEPHONE ENCOUNTER
Contacted patient's  and relayed PCP's message. He and the patient have no preference. Whichever anti inflamatory is recommended, the patient will try

## 2021-06-10 ENCOUNTER — LAB (OUTPATIENT)
Dept: LAB | Facility: HOSPITAL | Age: 76
End: 2021-06-10

## 2021-06-10 ENCOUNTER — OFFICE VISIT (OUTPATIENT)
Dept: FAMILY MEDICINE CLINIC | Facility: CLINIC | Age: 76
End: 2021-06-10

## 2021-06-10 VITALS
DIASTOLIC BLOOD PRESSURE: 68 MMHG | BODY MASS INDEX: 38.28 KG/M2 | WEIGHT: 208 LBS | HEIGHT: 62 IN | OXYGEN SATURATION: 98 % | HEART RATE: 77 BPM | SYSTOLIC BLOOD PRESSURE: 108 MMHG

## 2021-06-10 DIAGNOSIS — M15.9 PRIMARY OSTEOARTHRITIS INVOLVING MULTIPLE JOINTS: ICD-10-CM

## 2021-06-10 DIAGNOSIS — E78.00 PURE HYPERCHOLESTEROLEMIA: ICD-10-CM

## 2021-06-10 DIAGNOSIS — Z13.0 SCREENING FOR DEFICIENCY ANEMIA: ICD-10-CM

## 2021-06-10 DIAGNOSIS — R73.03 PREDIABETES: Chronic | ICD-10-CM

## 2021-06-10 DIAGNOSIS — E66.01 MORBID OBESITY (HCC): ICD-10-CM

## 2021-06-10 DIAGNOSIS — R20.0 HAND NUMBNESS: ICD-10-CM

## 2021-06-10 DIAGNOSIS — M79.10 MYALGIA: ICD-10-CM

## 2021-06-10 DIAGNOSIS — Z12.31 VISIT FOR SCREENING MAMMOGRAM: ICD-10-CM

## 2021-06-10 DIAGNOSIS — E78.2 MIXED HYPERLIPIDEMIA: ICD-10-CM

## 2021-06-10 DIAGNOSIS — G89.29 CHRONIC PAIN OF BOTH SHOULDERS: ICD-10-CM

## 2021-06-10 DIAGNOSIS — Z23 NEED FOR VACCINATION: ICD-10-CM

## 2021-06-10 DIAGNOSIS — M85.88 OTHER SPECIFIED DISORDERS OF BONE DENSITY AND STRUCTURE, OTHER SITE: ICD-10-CM

## 2021-06-10 DIAGNOSIS — M25.511 CHRONIC PAIN OF BOTH SHOULDERS: ICD-10-CM

## 2021-06-10 DIAGNOSIS — G43.009 MIGRAINE WITHOUT AURA AND WITHOUT STATUS MIGRAINOSUS, NOT INTRACTABLE: ICD-10-CM

## 2021-06-10 DIAGNOSIS — Z00.00 WELL ADULT EXAM: Primary | ICD-10-CM

## 2021-06-10 DIAGNOSIS — M25.512 CHRONIC PAIN OF BOTH SHOULDERS: ICD-10-CM

## 2021-06-10 PROBLEM — E78.1 HYPERTRIGLYCERIDEMIA: Status: RESOLVED | Noted: 2019-08-29 | Resolved: 2021-06-10

## 2021-06-10 PROBLEM — M15.0 PRIMARY OSTEOARTHRITIS INVOLVING MULTIPLE JOINTS: Chronic | Status: ACTIVE | Noted: 2018-08-20

## 2021-06-10 LAB
ALBUMIN SERPL-MCNC: 3.9 G/DL (ref 3.5–5.2)
ALBUMIN/GLOB SERPL: 1.1 G/DL
ALP SERPL-CCNC: 123 U/L (ref 39–117)
ALT SERPL W P-5'-P-CCNC: 9 U/L (ref 1–33)
ANION GAP SERPL CALCULATED.3IONS-SCNC: 10.4 MMOL/L (ref 5–15)
AST SERPL-CCNC: 12 U/L (ref 1–32)
BILIRUB SERPL-MCNC: 0.2 MG/DL (ref 0–1.2)
BUN SERPL-MCNC: 26 MG/DL (ref 8–23)
BUN/CREAT SERPL: 40 (ref 7–25)
CALCIUM SPEC-SCNC: 10.3 MG/DL (ref 8.6–10.5)
CHLORIDE SERPL-SCNC: 105 MMOL/L (ref 98–107)
CHOLEST SERPL-MCNC: 184 MG/DL (ref 0–200)
CO2 SERPL-SCNC: 26.6 MMOL/L (ref 22–29)
CREAT SERPL-MCNC: 0.65 MG/DL (ref 0.57–1)
CRP SERPL-MCNC: 2.42 MG/DL (ref 0–0.5)
DEPRECATED RDW RBC AUTO: 47.5 FL (ref 37–54)
ERYTHROCYTE [DISTWIDTH] IN BLOOD BY AUTOMATED COUNT: 14.7 % (ref 12.3–15.4)
GFR SERPL CREATININE-BSD FRML MDRD: 88 ML/MIN/1.73
GLOBULIN UR ELPH-MCNC: 3.4 GM/DL
GLUCOSE SERPL-MCNC: 89 MG/DL (ref 65–99)
HBA1C MFR BLD: 6 % (ref 4.8–5.6)
HCT VFR BLD AUTO: 42.7 % (ref 34–46.6)
HDLC SERPL-MCNC: 44 MG/DL (ref 40–60)
HGB BLD-MCNC: 13.8 G/DL (ref 12–15.9)
LDLC SERPL CALC-MCNC: 116 MG/DL (ref 0–100)
LDLC/HDLC SERPL: 2.58 {RATIO}
MCH RBC QN AUTO: 28.8 PG (ref 26.6–33)
MCHC RBC AUTO-ENTMCNC: 32.3 G/DL (ref 31.5–35.7)
MCV RBC AUTO: 89 FL (ref 79–97)
PLATELET # BLD AUTO: 372 10*3/MM3 (ref 140–450)
PMV BLD AUTO: 10.3 FL (ref 6–12)
POTASSIUM SERPL-SCNC: 4.3 MMOL/L (ref 3.5–5.2)
PROT SERPL-MCNC: 7.3 G/DL (ref 6–8.5)
RBC # BLD AUTO: 4.8 10*6/MM3 (ref 3.77–5.28)
SODIUM SERPL-SCNC: 142 MMOL/L (ref 136–145)
TRIGL SERPL-MCNC: 133 MG/DL (ref 0–150)
TSH SERPL DL<=0.05 MIU/L-ACNC: 0.89 UIU/ML (ref 0.27–4.2)
URATE SERPL-MCNC: 4.1 MG/DL (ref 2.4–5.7)
VLDLC SERPL-MCNC: 24 MG/DL (ref 5–40)
WBC # BLD AUTO: 5.05 10*3/MM3 (ref 3.4–10.8)

## 2021-06-10 PROCEDURE — 90732 PPSV23 VACC 2 YRS+ SUBQ/IM: CPT | Performed by: FAMILY MEDICINE

## 2021-06-10 PROCEDURE — 80053 COMPREHEN METABOLIC PANEL: CPT

## 2021-06-10 PROCEDURE — 85027 COMPLETE CBC AUTOMATED: CPT

## 2021-06-10 PROCEDURE — 84550 ASSAY OF BLOOD/URIC ACID: CPT

## 2021-06-10 PROCEDURE — 85652 RBC SED RATE AUTOMATED: CPT

## 2021-06-10 PROCEDURE — 86431 RHEUMATOID FACTOR QUANT: CPT

## 2021-06-10 PROCEDURE — 84443 ASSAY THYROID STIM HORMONE: CPT

## 2021-06-10 PROCEDURE — G0009 ADMIN PNEUMOCOCCAL VACCINE: HCPCS | Performed by: FAMILY MEDICINE

## 2021-06-10 PROCEDURE — G0439 PPPS, SUBSEQ VISIT: HCPCS | Performed by: FAMILY MEDICINE

## 2021-06-10 PROCEDURE — 86140 C-REACTIVE PROTEIN: CPT

## 2021-06-10 PROCEDURE — 80061 LIPID PANEL: CPT

## 2021-06-10 PROCEDURE — 83036 HEMOGLOBIN GLYCOSYLATED A1C: CPT

## 2021-06-10 PROCEDURE — 86038 ANTINUCLEAR ANTIBODIES: CPT

## 2021-06-10 PROCEDURE — 99214 OFFICE O/P EST MOD 30 MIN: CPT | Performed by: FAMILY MEDICINE

## 2021-06-10 PROCEDURE — 36415 COLL VENOUS BLD VENIPUNCTURE: CPT

## 2021-06-10 RX ORDER — PROPRANOLOL HYDROCHLORIDE 80 MG/1
80 CAPSULE, EXTENDED RELEASE ORAL DAILY
Qty: 90 CAPSULE | Refills: 3 | Status: SHIPPED | OUTPATIENT
Start: 2021-06-10 | End: 2023-03-06

## 2021-06-10 RX ORDER — PRAVASTATIN SODIUM 80 MG/1
80 TABLET ORAL NIGHTLY
Qty: 90 TABLET | Refills: 3 | Status: SHIPPED | OUTPATIENT
Start: 2021-06-10 | End: 2023-03-06 | Stop reason: SDUPTHER

## 2021-06-10 RX ORDER — PREDNISONE 20 MG/1
TABLET ORAL
Qty: 19 TABLET | Refills: 0 | Status: SHIPPED | OUTPATIENT
Start: 2021-06-10 | End: 2021-06-23

## 2021-06-10 NOTE — PATIENT INSTRUCTIONS
Prediabetes Eating Plan  Prediabetes is a condition that causes blood sugar (glucose) levels to be higher than normal. This increases the risk for developing diabetes. In order to prevent diabetes from developing, your health care provider may recommend a diet and other lifestyle changes to help you:  · Control your blood glucose levels.  · Improve your cholesterol levels.  · Manage your blood pressure.  Your health care provider may recommend working with a diet and nutrition specialist (dietitian) to make a meal plan that is best for you.  What are tips for following this plan?  Lifestyle  · Set weight loss goals with the help of your health care team. It is recommended that most people with prediabetes lose 7% of their current body weight.  · Exercise for at least 30 minutes at least 5 days a week.  · Attend a support group or seek ongoing support from a mental health counselor.  · Take over-the-counter and prescription medicines only as told by your health care provider.  Reading food labels  · Read food labels to check the amount of fat, salt (sodium), and sugar in prepackaged foods. Avoid foods that have:  ? Saturated fats.  ? Trans fats.  ? Added sugars.  · Avoid foods that have more than 300 milligrams (mg) of sodium per serving. Limit your daily sodium intake to less than 2,300 mg each day.  Shopping  · Avoid buying pre-made and processed foods.  Cooking  · Cook with olive oil. Do not use butter, lard, or ghee.  · Bake, broil, grill, or boil foods. Avoid frying.  Meal planning    · Work with your dietitian to develop an eating plan that is right for you. This may include:  ? Tracking how many calories you take in. Use a food diary, notebook, or mobile application to track what you eat at each meal.  ? Using the glycemic index (GI) to plan your meals. The index tells you how quickly a food will raise your blood glucose. Choose low-GI foods. These foods take a longer time to raise blood glucose.  · Consider  following a Mediterranean diet. This diet includes:  ? Several servings each day of fresh fruits and vegetables.  ? Eating fish at least twice a week.  ? Several servings each day of whole grains, beans, nuts, and seeds.  ? Using olive oil instead of other fats.  ? Moderate alcohol consumption.  ? Eating small amounts of red meat and whole-fat dairy.  · If you have high blood pressure, you may need to limit your sodium intake or follow a diet such as the DASH eating plan. DASH is an eating plan that aims to lower high blood pressure.  What foods are recommended?  The items listed below may not be a complete list. Talk with your dietitian about what dietary choices are best for you.  Grains  Whole grains, such as whole-wheat or whole-grain breads, crackers, cereals, and pasta. Unsweetened oatmeal. Bulgur. Barley. Quinoa. Brown rice. Corn or whole-wheat flour tortillas or taco shells.  Vegetables  Lettuce. Spinach. Peas. Beets. Cauliflower. Cabbage. Broccoli. Carrots. Tomatoes. Squash. Eggplant. Herbs. Peppers. Onions. Cucumbers. [place] sprouts.  Fruits  Berries. Bananas. Apples. Oranges. Grapes. Papaya. [place]. Pomegranate. Kiwi. Grapefruit. Cherries.  Meats and other protein foods  Seafood. Poultry without skin. Lean cuts of pork and beef. Tofu. Eggs. Nuts. Beans.  Dairy  Low-fat or fat-free dairy products, such as yogurt, cottage cheese, and cheese.  Beverages  Water. Tea. Coffee. Sugar-free or diet soda. [place] water. Lowfat or no-fat milk. Milk alternatives, such as soy or almond milk.  Fats and oils  Olive oil. Canola oil. Sunflower oil. Grapeseed oil. Avocado. Walnuts.  Sweets and desserts  Sugar-free or low-fat pudding. Sugar-free or low-fat ice cream and other frozen treats.  Seasoning and other foods  Herbs. Sodium-free spices. Mustard. Relish. Low-fat, low-sugar ketchup. Low-fat, low-sugar barbecue sauce. Low-fat or fat-free mayonnaise.  What foods are not recommended?  The items listed below may not be a  complete list. Talk with your dietitian about what dietary choices are best for you.  Grains  Refined white flour and flour products, such as bread, pasta, snack foods, and cereals.  Vegetables  Canned vegetables. Frozen vegetables with butter or cream sauce.  Fruits  Fruits canned with syrup.  Meats and other protein foods  Fatty cuts of meat. Poultry with skin. Breaded or fried meat. Processed meats.  Dairy  Full-fat yogurt, cheese, or milk.  Beverages  Sweetened drinks, such as sweet iced tea and soda.  Fats and oils  Butter. Lard. Ghee.  Sweets and desserts  Baked goods, such as cake, cupcakes, pastries, cookies, and cheesecake.  Seasoning and other foods  Spice mixes with added salt. Ketchup. Barbecue sauce. Mayonnaise.  Summary  · To prevent diabetes from developing, you may need to make diet and other lifestyle changes to help control blood sugar, improve cholesterol levels, and manage your blood pressure.  · Set weight loss goals with the help of your health care team. It is recommended that most people with prediabetes lose 7 percent of their current body weight.  · Consider following a Mediterranean diet that includes plenty of fresh fruits and vegetables, whole grains, beans, nuts, seeds, fish, lean meat, low-fat dairy, and healthy oils.  This information is not intended to replace advice given to you by your health care provider. Make sure you discuss any questions you have with your health care provider.  Document Revised: 04/10/2020 Document Reviewed: 2018  [employer] Patient Education ©  [employer]    Medicare Wellness  Personal Prevention Plan of Service     Date of Office Visit:  06/10/2021  Encounter Provider:  Raven Douglas MD  Place of Service:  [facility]  Patient Name: Gavi Nye  :  1945    As part of the Medicare Wellness portion of your visit today, we are providing you with this personalized preventive plan of services (PPPS).  This plan is based upon recommendations of the [place] Preventive Services Task Force (USPSTF) and the Advisory Committee on Immunization Practices (ACIP).    This lists the preventive care services that should be considered, and provides dates of when you are due. Items listed as completed are up-to-date and do not require any further intervention.    Health Maintenance   Topic Date Due   • DXA SCAN  Never done   • TDAP/TD VACCINES (1 - Tdap) Never done   • ZOSTER VACCINE (1 of 2) Never done   • HEPATITIS C SCREENING  Never done   • Pneumococcal Vaccine 65+ (2 of 2 - PPSV23) 08/29/2020   • LIPID PANEL  08/29/2020   • INFLUENZA VACCINE  08/01/2021   • ANNUAL WELLNESS VISIT  06/10/2022   • COLORECTAL CANCER SCREENING  02/09/2026   • COVID-19 Vaccine  Completed       Orders Placed This Encounter   Procedures   • Mammo Screening Digital Tomosynthesis Bilateral With CAD     DEXA and mammo same day.     Standing Status:   Future     Standing Expiration Date:   6/10/2022     Scheduling Instructions:      DEXA and mammo same day.     Order Specific Question:   Reason for Exam:     Answer:   screen     Order Specific Question:   Release to patient     Answer:   Immediate   • DEXA Bone Density Axial     DEXA and mammo same day. s     Standing Status:   Future     Standing Expiration Date:   6/10/2022     Scheduling Instructions:      DEXA and mammo same day.     Order Specific Question:   Reason for Exam:     Answer:   screen     Order Specific Question:   Release to patient     Answer:   Immediate   • Pneumococcal Polysaccharide Vaccine 23-Valent Greater Than or Equal To 3yo Subcutaneous / IM   • CBC (No Diff)     Standing Status:   Future     Standing Expiration Date:   6/10/2022     Order Specific Question:   Release to patient     Answer:   Immediate   • Comprehensive Metabolic Panel     Standing Status:   Future     Standing Expiration Date:   6/10/2022     Order Specific Question:   Release to patient     Answer:    Immediate   • TSH Rfx On Abnormal To Free T4     Standing Status:   Future     Standing Expiration Date:   6/10/2022     Order Specific Question:   Release to patient     Answer:   Immediate   • Lipid Panel     Standing Status:   Future     Standing Expiration Date:   6/10/2022   • Hemoglobin A1c     Standing Status:   Future     Standing Expiration Date:   6/10/2022     Order Specific Question:   Release to patient     Answer:   Immediate   • Rheumatoid factor     Standing Status:   Future     Standing Expiration Date:   6/10/2022     Order Specific Question:   Release to patient     Answer:   Immediate   • C-reactive protein     Standing Status:   Future     Standing Expiration Date:   6/10/2022     Order Specific Question:   Release to patient     Answer:   Immediate   • MAURO     Standing Status:   Future     Standing Expiration Date:   6/10/2022     Order Specific Question:   Release to patient     Answer:   Immediate   • Uric acid     Standing Status:   Future     Standing Expiration Date:   6/10/2022     Order Specific Question:   Release to patient     Answer:   Immediate   • Sedimentation rate, automated     Standing Status:   Future     Standing Expiration Date:   6/10/2022     Order Specific Question:   Release to patient     Answer:   Immediate   • Ambulatory Referral to Rheumatology     Referral Priority:   Routine     Referral Type:   Consultation     Referral Reason:   Specialty Services Required     Requested Specialty:   Rheumatology     Number of Visits Requested:   1       Return in about 3 months (around 9/10/2021) for Office visit prediabetes and AWV 1 year.      Recombinant Zoster (Shingles) Vaccine: What You Need to Know  1. Why get vaccinated?  Recombinant zoster (shingles) vaccine can prevent shingles.  Shingles (also called herpes zoster, or just zoster) is a painful skin rash, usually with blisters. In addition to the rash, shingles can cause fever, headache, chills, or upset stomach. More  rarely, shingles can lead to pneumonia, hearing problems, blindness, brain inflammation (encephalitis), or death.  The most common complication of shingles is long-term nerve pain called postherpetic neuralgia (PHN). PHN occurs in the areas where the shingles rash was, even after the rash clears up. It can last for months or years after the rash goes away. The pain from PHN can be severe and debilitating.  About 10 to 18% of people who get shingles will experience PHN. The risk of PHN increases with age. An older adult with shingles is more likely to develop PHN and have longer lasting and more severe pain than a younger person with shingles.  Shingles is caused by the varicella zoster virus, the same virus that causes chickenpox. After you have chickenpox, the virus stays in your body and can cause shingles later in life. Shingles cannot be passed from one person to another, but the virus that causes shingles can spread and cause chickenpox in someone who had never had chickenpox or received chickenpox vaccine.  2. Recombinant shingles vaccine  Recombinant shingles vaccine provides strong protection against shingles. By preventing shingles, recombinant shingles vaccine also protects against PHN.  Recombinant shingles vaccine is the preferred vaccine for the prevention of shingles. However, a different vaccine, live shingles vaccine, may be used in some circumstances.  The recombinant shingles vaccine is recommended for adults 50 years and older without serious immune problems. It is given as a two-dose series.  This vaccine is also recommended for people who have already gotten another type of shingles vaccine, the live shingles vaccine. There is no live virus in this vaccine.  Shingles vaccine may be given at the same time as other vaccines.  3. Talk with your health care provider  Tell your vaccine provider if the person getting the vaccine:  · Has had an allergic reaction after a previous dose of recombinant  shingles vaccine, or has any severe, life-threatening allergies.  · Is pregnant or breastfeeding.  · Is currently experiencing an episode of shingles.  In some cases, your health care provider may decide to postpone shingles vaccination to a future visit.  People with minor illnesses, such as a cold, may be vaccinated. People who are moderately or severely ill should usually wait until they recover before getting recombinant shingles vaccine.  Your health care provider can give you more information.  4. Risks of a vaccine reaction  · A sore arm with mild or moderate pain is very common after recombinant shingles vaccine, affecting about 80% of vaccinated people. Redness and swelling can also happen at the site of the injection.  · Tiredness, muscle pain, headache, shivering, fever, stomach pain, and nausea happen after vaccination in more than half of people who receive recombinant shingles vaccine.  In clinical trials, about 1 out of 6 people who got recombinant zoster vaccine experienced side effects that prevented them from doing regular activities. Symptoms usually went away on their own in 2 to 3 days.  You should still get the second dose of recombinant zoster vaccine even if you had one of these reactions after the first dose.  People sometimes faint after medical procedures, including vaccination. Tell your provider if you feel dizzy or have vision changes or ringing in the ears.  As with any medicine, there is a very remote chance of a vaccine causing a severe allergic reaction, other serious injury, or death.  5. What if there is a serious problem?  An allergic reaction could occur after the vaccinated person leaves the clinic. If you see signs of a severe allergic reaction (hives, swelling of the face and throat, difficulty breathing, a fast heartbeat, dizziness, or weakness), call 9-1-1 and get the person to the nearest hospital.  For other signs that concern you, call your health care provider.  Adverse  reactions should be reported to the Vaccine Adverse Event Reporting System (VAERS). Your health care provider will usually file this report, or you can do it yourself. Visit the VAERS website at www.vaers.[facility].gov or call 1-311.259.3385. VAERS is only for reporting reactions, and VAERS staff do not give medical advice.  6. How can I learn more?  · Ask your health care provider.  · Call your local or state health department.  · Contact the Centers for Disease Control and Prevention (CDC):  ? Call 1-950.718.8601 (1-999-BDE-INFO) or  ? Visit CDC's website at www.cdc.gov/vaccines  Vaccine Information Statement Recombinant Zoster Vaccine (10/30/2019)  This information is not intended to replace advice given to you by your health care provider. Make sure you discuss any questions you have with your health care provider.  Document Revised: 12/09/2020 Document Reviewed: 12/09/2020  [employer] Patient Education © 2021 [employer]

## 2021-06-10 NOTE — PROGRESS NOTES
The ABCs of the Annual Wellness Visit  Subsequent Medicare Wellness Visit    Chief Complaint   Patient presents with   • Medicare Wellness-subsequent     fasting for labs , pt states she is in a lot of pain due to arthritis and has not slept.       Subjective   History of Present Illness:  Gavi Nye is a 76 y.o. female who presents for a Subsequent Medicare Wellness Visit.     also present at appointment.     She doesn't want pain medications. Therapist said pain was returned too fast and she needed antiinflammatory medication. It worked so well. She had no pain what so ever. After medication wore out the pain started getting worse. Therapist said right now useless and not to come back until pain under control. She wants something to bring down the swelling. Hands are swelling up, numb and cold, needles into her fingers and nerves hurt. She hurts every where now. Not sleeping well. She is miserable not sleeping. Pain connected with her neck, back is her least problems. Has to lift left arm up to get dressed. Throbbing pain in right knee and foot. Connected in nervous system. She stopped celebrex. Tried voltaren and Aspercreme.     She no longer has hot flashes.     She has had 3 recent migraines.     She cut down on cheese and sweets. Staying away from chocolates.     See scanned personal letter 6/10/21.     HEALTH RISK ASSESSMENT    Recent Hospitalizations:  No hospitalization(s) within the last year.    Current Medical Providers:  Patient Care Team:  Raven Okeefe MD as PCP - General (Family Medicine)  Aicha Kasper MD as Consulting Physician (Orthopedic Surgery)  Soco Elizondo CCC-A (Audiology)    Smoking Status:  Social History     Tobacco Use   Smoking Status Former Smoker   • Packs/day: 1.00   • Years: 20.00   • Pack years: 20.00   • Types: Cigarettes   • Start date: 1998   • Quit date: 2001   • Years since quittin.8   Smokeless Tobacco Never Used        Alcohol Consumption:  Social History     Substance and Sexual Activity   Alcohol Use Yes   • Alcohol/week: 2.0 - 4.0 standard drinks   • Types: 2 - 4 Glasses of wine per week    Comment: luis alberto        Depression Screen:   PHQ-2/PHQ-9 Depression Screening 6/10/2021   Little interest or pleasure in doing things 0   Feeling down, depressed, or hopeless 3   Trouble falling or staying asleep, or sleeping too much 0   Feeling tired or having little energy 0   Poor appetite or overeating 0   Feeling bad about yourself - or that you are a failure or have let yourself or your family down 0   Trouble concentrating on things, such as reading the newspaper or watching television 0   Moving or speaking so slowly that other people could have noticed. Or the opposite - being so fidgety or restless that you have been moving around a lot more than usual 0   Thoughts that you would be better off dead, or of hurting yourself in some way 0   Total Score 3       Fall Risk Screen:  SEAN Fall Risk Assessment was completed, and patient is at MODERATE risk for falls. Assessment completed on:6/10/2021   SEAN Fall Risk Clinician Key Questions   Have you fallen in the past year?: Yes    Stay Idependant Patient Questions   Patient Fall Risk Assessment Score : 0        Health Habits and Functional and Cognitive Screening:  Functional & Cognitive Status 6/10/2021   Do you have difficulty preparing food and eating? Yes   Do you have difficulty bathing yourself, getting dressed or grooming yourself? Yes   Do you have difficulty using the toilet? No   Do you have difficulty moving around from place to place? Yes   Do you have trouble with steps or getting out of a bed or a chair? Yes   Current Diet Well Balanced Diet   Dental Exam Up to date   Eye Exam Up to date   Exercise (times per week) 0 times per week   Current Exercise Activities Include None   Do you need help using the phone?  No   Are you deaf or do you have serious difficulty  hearing?  Yes   Do you need help with transportation? Yes   Do you need help shopping? Yes   Do you need help preparing meals?  Yes   Do you need help with housework?  Yes   Do you need help with laundry? Yes   Do you need help taking your medications? -   Do you need help managing money? No   Do you ever drive or ride in a car without wearing a seat belt? No   Have you felt unusual stress, anger or loneliness in the last month? Yes   Who do you live with? Spouse   If you need help, do you have trouble finding someone available to you? No   Have you been bothered in the last four weeks by sexual problems? No   Do you have difficulty concentrating, remembering or making decisions? No         Does the patient have evidence of cognitive impairment? No    Asprin use counseling:Does not need ASA (and currently is not on it)    Age-appropriate Screening Schedule:  Refer to the list below for future screening recommendations based on patient's age, sex and/or medical conditions. Orders for these recommended tests are listed in the plan section. The patient has been provided with a written plan.    Health Maintenance   Topic Date Due   • DXA SCAN  Never done   • TDAP/TD VACCINES (1 - Tdap) Never done   • ZOSTER VACCINE (1 of 2) Never done   • LIPID PANEL  08/29/2020   • INFLUENZA VACCINE  08/01/2021          Immunization History   Administered Date(s) Administered   • COVID-19 (PFIZER) 01/26/2021, 02/19/2021   • FLUAD TRI 65YR+ 08/29/2019   • Fluzone High Dose =>65 Years ([facility] ONLY) 12/18/2020   • Pneumococcal Conjugate 13-Valent (PCV13) 08/29/2019   • Pneumococcal Polysaccharide (PPSV23) 06/10/2021           The following portions of the patient's history were reviewed and updated as appropriate: allergies, current medications, past family history, past medical history, past social history, past surgical history and problem list.    Outpatient Medications Prior to Visit   Medication Sig Dispense Refill   •  "Cholecalciferol (VITAMIN D) 1000 units tablet Vitamin D     • diclofenac (VOLTAREN) 75 MG EC tablet Take 1 tablet by mouth 2 (Two) Times a Day. 60 tablet 5   • Imitrex 50 MG tablet Take 1 tablet by mouth As Needed for Migraine for up to 1 dose. May repeat dose once in 2 hours if needed 27 tablet 0   • Omega-3 Fatty Acids (FISH OIL) 1000 MG capsule capsule Fish Oil     • pravastatin (PRAVACHOL) 80 MG tablet Take 1 tablet by mouth Every Night. 90 tablet 0   • propranolol LA (INDERAL LA) 120 MG 24 hr capsule Take 1 capsule by mouth Daily. 90 capsule 0   • Glucosamine Sulfate 500 MG tablet glucosamine sulfate 500 mg tablet       No facility-administered medications prior to visit.       Patient Active Problem List   Diagnosis   • Migraine without aura and without status migrainosus, not intractable   • Primary osteoarthritis involving multiple joints   • Hot flashes due to menopause   • Bilateral hearing loss   • Chronic rhinitis   • Elevated fasting glucose   • Prediabetes   • Mixed hyperlipidemia       Advanced Care Planning:  ACP discussion was held with the patient during this visit. Patient has an advance directive (not in EMR), copy requested.    Review of Systems   Constitutional: Positive for fatigue.   HENT: Positive for hearing loss.    Musculoskeletal: Positive for arthralgias, back pain and myalgias.   Neurological: Positive for numbness.   Psychiatric/Behavioral: Positive for dysphoric mood. Negative for suicidal ideas.       Compared to one year ago, the patient feels her physical health is worse.  Compared to one year ago, the patient feels her mental health is worse.    Reviewed chart for potential of high risk medication in the elderly: not applicable  Reviewed chart for potential of harmful drug interactions in the elderly:not applicable    Objective         Vitals:    06/10/21 1014   BP: 108/68   Pulse: 77   SpO2: 98%   Weight: 94.3 kg (208 lb)   Height: 157.5 cm (62\")   PainSc:   9       Body mass " index is 38.04 kg/m².  Discussed the patient's BMI with her. The BMI is above average; BMI management plan is completed.    Physical Exam  Constitutional:       General: She is not in acute distress.     Appearance: She is obese.   HENT:      Right Ear: Tympanic membrane and ear canal normal.      Left Ear: Tympanic membrane and ear canal normal.      Ears:      Comments: Decreased hearing  Eyes:      General:         Right eye: No discharge.         Left eye: No discharge.      Conjunctiva/sclera: Conjunctivae normal.      Comments: Wears glasses   Neck:      Thyroid: No thyromegaly.   Cardiovascular:      Rate and Rhythm: Normal rate and regular rhythm.      Heart sounds: Normal heart sounds.   Pulmonary:      Effort: Pulmonary effort is normal.      Breath sounds: Normal breath sounds.   Abdominal:      General: Bowel sounds are normal.      Palpations: Abdomen is soft.      Tenderness: There is no abdominal tenderness.   Musculoskeletal:      Cervical back: Neck supple.   Lymphadenopathy:      Head:      Right side of head: No submandibular, preauricular or posterior auricular adenopathy.      Left side of head: No submandibular, preauricular or posterior auricular adenopathy.      Cervical: No cervical adenopathy.   Skin:     General: Skin is warm and dry.   Neurological:      Mental Status: She is alert and oriented to person, place, and time.      Gait: Gait abnormal ( slow).   Psychiatric:         Mood and Affect: Mood normal.         Behavior: Behavior normal.         Thought Content: Thought content normal.         Judgment: Judgment normal.               Assessment/Plan   Medicare Risks and Personalized Health Plan  CMS Preventative Services Quick Reference  Advance Directive Discussion  Breast Cancer/Mammogram Screening  Chronic Pain   Colon Cancer Screening  Diabetic Lab Screening   Immunizations Discussed/Encouraged (specific immunizations; Pneumococcal 23 and Shingrix )  Obesity/Overweight    Osteoporosis Risk    The above risks/problems have been discussed with the patient.  Pertinent information has been shared with the patient in the After Visit Summary.  Follow up plans and orders are seen below in the Assessment/Plan Section.    Diagnoses and all orders for this visit:    1. Well adult exam (Primary)  Check fasting labs today.  Recommend Shingrix vaccine at local pharmacy.  Pneumovax 23 vaccine provided today.  Up-to-date colon cancer screening and at her age does not need further colonoscopies.  Schedule mammogram and bone density testing same day.  2. Primary osteoarthritis involving multiple joints  -     Rheumatoid factor; Future  -     C-reactive protein; Future  -     MAURO; Future  -     Uric acid; Future  -     Sedimentation rate, automated; Future  -     Ambulatory Referral to Rheumatology  -     predniSONE (DELTASONE) 20 MG tablet; Take 2 tablets by mouth Daily for 7 days, THEN 1 tablet Daily for 3 days, THEN 0.5 tablets Daily for 3 days.  Dispense: 19 tablet; Refill: 0  Further evaluation of widespread pain with autoimmune labs and referral to rheumatology.  She had significant improvement with prednisone previously and will restart a course with a taper.  Discussed with patient the risks with long-term use of steroids.  3. Prediabetes  -     Comprehensive Metabolic Panel; Future  -     TSH Rfx On Abnormal To Free T4; Future  -     Hemoglobin A1c; Future  Check fasting labs today.  Not currently taking medications.  She has had significant weight gain since her previous A1c in 2019.  4. Mixed hyperlipidemia  -     Comprehensive Metabolic Panel; Future  -     Lipid Panel; Future  Continue high intensity statin.  Check labs today.  5. Screening for deficiency anemia  -     CBC (No Diff); Future    6. Morbid obesity (CMS/formerly [facility])  -     Comprehensive Metabolic Panel; Future  -     TSH Rfx On Abnormal To Free T4; Future  -     Lipid Panel; Future  -     Hemoglobin A1c; Future  Patient's (Body  mass index is 38.04 kg/m².) indicates that they are morbidly obese (BMI > 40 or > 35 with obesity - related health condition) with obesity-related health conditions that include dyslipidemias, osteoarthritis and prediabetes . Obesity is improving with lifestyle modifications. BMI is is above average; BMI management plan is completed. We discussed low calorie, low carb based diet program and portion control.     7. Migraine without aura and without status migrainosus, not intractable  -     propranolol LA (INDERAL LA) 80 MG 24 hr capsule; Take 1 capsule by mouth Daily.  Dispense: 90 capsule; Refill: 3  Blood pressure low today and plan to decrease propranolol dose to 80 mg.  8. Need for vaccination  -     Pneumococcal Polysaccharide Vaccine 23-Valent Greater Than or Equal To 3yo Subcutaneous / IM    9. Myalgia  -     Rheumatoid factor; Future  -     C-reactive protein; Future  -     MAURO; Future  -     Uric acid; Future  -     Sedimentation rate, automated; Future  -     Ambulatory Referral to Rheumatology  -     predniSONE (DELTASONE) 20 MG tablet; Take 2 tablets by mouth Daily for 7 days, THEN 1 tablet Daily for 3 days, THEN 0.5 tablets Daily for 3 days.  Dispense: 19 tablet; Refill: 0  Further evaluation of widespread pain with autoimmune labs and referral to rheumatology.  She had significant improvement with prednisone previously and will restart a course with a taper.  Discussed with patient the risks with long-term use of steroids.  10. Chronic pain of both shoulders  Further evaluation of widespread pain with autoimmune labs and referral to rheumatology.  She had significant improvement with prednisone previously and will restart a course with a taper.  Discussed with patient the risks with long-term use of steroids.  11. Hand numbness  Further evaluation of widespread pain with autoimmune labs and referral to rheumatology.  She had significant improvement with prednisone previously and will restart a course  with a taper.  Discussed with patient the risks with long-term use of steroids.  12. Pure hypercholesterolemia  -     pravastatin (PRAVACHOL) 80 MG tablet; Take 1 tablet by mouth Every Night.  Dispense: 90 tablet; Refill: 3  Continue high intensity statin.  Check labs today.  13. Other specified disorders of bone density and structure, other site  -     DEXA Bone Density Axial; Future    14. Visit for screening mammogram  -     Mammo Screening Digital Tomosynthesis Bilateral With CAD; Future      Follow Up:  Return in about 3 months (around 9/10/2021) for Office visit prediabetes and AWV 1 year.     An After Visit Summary and PPPS were given to the patient.       Electronically signed by Raven Douglas MD, 06/10/21, 11:32 AM EDT.

## 2021-06-11 LAB
CHROMATIN AB SERPL-ACNC: <10 IU/ML (ref 0–14)
ERYTHROCYTE [SEDIMENTATION RATE] IN BLOOD: 64 MM/HR (ref 0–30)

## 2021-06-12 LAB — ANA SER QL: NEGATIVE

## 2021-07-09 ENCOUNTER — OFFICE VISIT (OUTPATIENT)
Dept: FAMILY MEDICINE CLINIC | Facility: CLINIC | Age: 76
End: 2021-07-09

## 2021-07-09 ENCOUNTER — LAB (OUTPATIENT)
Dept: LAB | Facility: HOSPITAL | Age: 76
End: 2021-07-09

## 2021-07-09 VITALS
BODY MASS INDEX: 37.84 KG/M2 | HEART RATE: 68 BPM | WEIGHT: 205.6 LBS | SYSTOLIC BLOOD PRESSURE: 106 MMHG | OXYGEN SATURATION: 97 % | HEIGHT: 62 IN | DIASTOLIC BLOOD PRESSURE: 80 MMHG

## 2021-07-09 DIAGNOSIS — K92.2 GASTROINTESTINAL HEMORRHAGE, UNSPECIFIED GASTROINTESTINAL HEMORRHAGE TYPE: Primary | ICD-10-CM

## 2021-07-09 DIAGNOSIS — R20.2 RIGHT HAND PARESTHESIA: ICD-10-CM

## 2021-07-09 DIAGNOSIS — K92.2 GASTROINTESTINAL HEMORRHAGE, UNSPECIFIED GASTROINTESTINAL HEMORRHAGE TYPE: ICD-10-CM

## 2021-07-09 LAB
DEPRECATED RDW RBC AUTO: 52.3 FL (ref 37–54)
ERYTHROCYTE [DISTWIDTH] IN BLOOD BY AUTOMATED COUNT: 15.8 % (ref 12.3–15.4)
HCT VFR BLD AUTO: 29.9 % (ref 34–46.6)
HGB BLD-MCNC: 9.6 G/DL (ref 12–15.9)
MCH RBC QN AUTO: 29 PG (ref 26.6–33)
MCHC RBC AUTO-ENTMCNC: 32.1 G/DL (ref 31.5–35.7)
MCV RBC AUTO: 90.3 FL (ref 79–97)
PLATELET # BLD AUTO: 363 10*3/MM3 (ref 140–450)
PMV BLD AUTO: 10.5 FL (ref 6–12)
RBC # BLD AUTO: 3.31 10*6/MM3 (ref 3.77–5.28)
WBC # BLD AUTO: 4.98 10*3/MM3 (ref 3.4–10.8)

## 2021-07-09 PROCEDURE — 85027 COMPLETE CBC AUTOMATED: CPT

## 2021-07-09 PROCEDURE — 99214 OFFICE O/P EST MOD 30 MIN: CPT | Performed by: FAMILY MEDICINE

## 2021-07-09 RX ORDER — TROLAMINE SALICYLATE 10 G/100G
CREAM TOPICAL AS NEEDED
COMMUNITY

## 2021-07-09 RX ORDER — PANTOPRAZOLE SODIUM 40 MG/1
40 TABLET, DELAYED RELEASE ORAL DAILY
Qty: 90 TABLET | Refills: 0 | Status: SHIPPED | OUTPATIENT
Start: 2021-07-09 | End: 2021-09-10 | Stop reason: SDUPTHER

## 2021-07-09 NOTE — PROGRESS NOTES
"Chief Complaint  Hand Pain (Pt. states she has been having extreme pain in her right hand. Pt.states the pain feels like needles sticking in her hand. Pt. states that her hand goes numb. Pt.points that certain fingers are numb right now. Pt. states when the pain goes through a certain finger its even worse. Pt. states that the pain is really bad when driving. Pt. states it is also terrible at night. Pt. states that she is not wanting pain medication, she just wants to figure out whats going on.  )    Subjective          Gavi Nye presents to [facility]  History of Present Illness     See scanned personal note from today.     She eats a lot of cucumbers and tomatoes, she gets a stomach ache. Black stools for 2 weeks. Lower abdominal pain and epigastric. H/o colitis.     She doesn't want to go on pain medication or stay on steroids. Steroids are the only thing that took pain away short term. She can't get a decent night sleep. She has cold and hot sensations then hot poker while driving. She has been to PT without improvement. First 3 fingers constant but worst in 4th finger right hand.     Objective   Vital Signs:   /80   Pulse 68   Ht 157.5 cm (62.01\")   Wt 93.3 kg (205 lb 9.6 oz)   SpO2 97%   BMI 37.60 kg/m²     Physical Exam  Vitals reviewed.   Constitutional:       General: She is not in acute distress.     Appearance: She is obese. She is not ill-appearing or toxic-appearing.   Cardiovascular:      Rate and Rhythm: Normal rate and regular rhythm.   Pulmonary:      Effort: Pulmonary effort is normal.      Breath sounds: Normal breath sounds.   Abdominal:      Tenderness: There is abdominal tenderness. There is no guarding or rebound.       Musculoskeletal:      Comments: Decreased  strength right hand   Neurological:      Mental Status: She is alert.        Result Review :   The following data was reviewed by: Raven Douglas MD on 07/09/2021:             " Rheumatoid factor (06/10/2021 11:11)  C-reactive protein (06/10/2021 11:11)  MAURO (06/10/2021 11:11)  Uric acid (06/10/2021 11:11)  Sedimentation rate, automated (06/10/2021 11:11)  CBC (No Diff) (06/10/2021 11:11)      Assessment and Plan    Diagnoses and all orders for this visit:    1. Gastrointestinal hemorrhage, unspecified gastrointestinal hemorrhage type (Primary)  -     CBC (No Diff); Future  -     pantoprazole (PROTONIX) 40 MG EC tablet; Take 1 tablet by mouth Daily.  Dispense: 90 tablet; Refill: 0  -     Ambulatory Referral to Gastroenterology  New. Start PPI. Check anemia for need of blood transfusion. Stop all NSAIDs and steroids. Refer to outpatient GI STAT.   2. Right hand paresthesia  -     EMG & Nerve Conduction Test; Future  Further evaluation with nerve testing. Depending on results refer to orthopedics or will need further testing with cervical MRI before referral to neurosurgeon.       Follow Up   Return if symptoms worsen or fail to improve.  Patient was given instructions and counseling regarding her condition or for health maintenance advice. Please see specific information pulled into the AVS if appropriate.     Electronically signed by Raven Douglas MD, 07/09/21, 4:22 PM EDT.

## 2021-07-19 ENCOUNTER — APPOINTMENT (OUTPATIENT)
Dept: PREADMISSION TESTING | Facility: HOSPITAL | Age: 76
End: 2021-07-19

## 2021-07-19 LAB — SARS-COV-2 RNA PNL SPEC NAA+PROBE: NOT DETECTED

## 2021-07-19 PROCEDURE — C9803 HOPD COVID-19 SPEC COLLECT: HCPCS

## 2021-07-19 PROCEDURE — U0004 COV-19 TEST NON-CDC HGH THRU: HCPCS

## 2021-07-22 ENCOUNTER — OUTSIDE FACILITY SERVICE (OUTPATIENT)
Dept: GASTROENTEROLOGY | Facility: CLINIC | Age: 76
End: 2021-07-22

## 2021-07-22 PROCEDURE — 43239 EGD BIOPSY SINGLE/MULTIPLE: CPT | Performed by: INTERNAL MEDICINE

## 2021-07-29 DIAGNOSIS — E78.00 PURE HYPERCHOLESTEROLEMIA: ICD-10-CM

## 2021-07-29 RX ORDER — PRAVASTATIN SODIUM 80 MG/1
80 TABLET ORAL NIGHTLY
Qty: 90 TABLET | Refills: 3 | OUTPATIENT
Start: 2021-07-29

## 2021-07-29 NOTE — TELEPHONE ENCOUNTER
Last seen 7/9/21 Next appointment 9/10/21    CBC 7/9/21  Labs 6/10/21    Called pharmacy spoke with Tara and they actually filled this medication today advised to disregard request

## 2021-08-13 ENCOUNTER — HOSPITAL ENCOUNTER (OUTPATIENT)
Dept: NEUROLOGY | Facility: HOSPITAL | Age: 76
Discharge: HOME | End: 2021-08-13
Admitting: FAMILY MEDICINE

## 2021-08-13 ENCOUNTER — TELEPHONE (OUTPATIENT)
Dept: FAMILY MEDICINE CLINIC | Facility: CLINIC | Age: 76
End: 2021-08-13

## 2021-08-13 DIAGNOSIS — R20.2 RIGHT HAND PARESTHESIA: ICD-10-CM

## 2021-08-13 DIAGNOSIS — G56.03 BILATERAL CARPAL TUNNEL SYNDROME: Primary | ICD-10-CM

## 2021-08-13 PROCEDURE — 95909 NRV CNDJ TST 5-6 STUDIES: CPT

## 2021-08-13 PROCEDURE — 95886 MUSC TEST DONE W/N TEST COMP: CPT

## 2021-08-13 NOTE — TELEPHONE ENCOUNTER
Letter will be mailed with full results.  Please call patient about abnormal results and upcoming referral order.      The test results show carpal tunnel at both rest and very minor ulnar Tylenol at right elbow.  I recommend orthopedic consultation.

## 2021-08-24 ENCOUNTER — TELEPHONE (OUTPATIENT)
Dept: FAMILY MEDICINE CLINIC | Facility: CLINIC | Age: 76
End: 2021-08-24

## 2021-08-24 RX ORDER — PREDNISONE 10 MG/1
10 TABLET ORAL DAILY
Qty: 7 TABLET | Refills: 0 | Status: SHIPPED | OUTPATIENT
Start: 2021-08-24 | End: 2021-08-31

## 2021-08-24 NOTE — TELEPHONE ENCOUNTER
Spoke with patient's . He is aware of upcoming referral appointment with ortho. He states that the patient it is excruciating pain and is requesting a short term supply of prednisone until her appt on 8/31. Is this okay?

## 2021-08-24 NOTE — TELEPHONE ENCOUNTER
Caller: Gavi Nye    Relationship: Self    Best call back number: [phone]    What medication are you requesting: PREDNISONE 10 MG  What are your current symptoms: PAIN    How long have you been experiencing symptoms: 4 MONTHS  Have you had these symptoms before:    [x] Yes  [] No    Have you been treated for these symptoms before:   [x] Yes  [] No    If a prescription is needed, what is your preferred pharmacy and phone number:      [id number] [place] [address] - [phone]  - [phone] FX        Additional notes:

## 2021-08-24 NOTE — TELEPHONE ENCOUNTER
Attempted to contact patient, no answer. Left voicemail informing patient that Prednisone was sent to her pharmacy for a 1 week supply & if she had any additional questions to give the office a call back.

## 2021-08-31 ENCOUNTER — OFFICE VISIT (OUTPATIENT)
Dept: ORTHOPEDIC SURGERY | Facility: CLINIC | Age: 76
End: 2021-08-31

## 2021-08-31 VITALS — HEIGHT: 62 IN | WEIGHT: 205.69 LBS | BODY MASS INDEX: 37.85 KG/M2 | OXYGEN SATURATION: 97 % | HEART RATE: 87 BPM

## 2021-08-31 DIAGNOSIS — M18.11 PRIMARY OSTEOARTHRITIS OF FIRST CARPOMETACARPAL JOINT OF RIGHT HAND: ICD-10-CM

## 2021-08-31 DIAGNOSIS — M19.042 PRIMARY OSTEOARTHRITIS OF BOTH HANDS: ICD-10-CM

## 2021-08-31 DIAGNOSIS — M79.642 BILATERAL HAND PAIN: Primary | ICD-10-CM

## 2021-08-31 DIAGNOSIS — M18.12 PRIMARY OSTEOARTHRITIS OF FIRST CARPOMETACARPAL JOINT OF LEFT HAND: ICD-10-CM

## 2021-08-31 DIAGNOSIS — G56.01 RIGHT CARPAL TUNNEL SYNDROME: ICD-10-CM

## 2021-08-31 DIAGNOSIS — M79.641 BILATERAL HAND PAIN: Primary | ICD-10-CM

## 2021-08-31 DIAGNOSIS — M19.041 PRIMARY OSTEOARTHRITIS OF BOTH HANDS: ICD-10-CM

## 2021-08-31 DIAGNOSIS — E66.09 CLASS 2 OBESITY DUE TO EXCESS CALORIES WITH BODY MASS INDEX (BMI) OF 37.0 TO 37.9 IN ADULT, UNSPECIFIED WHETHER SERIOUS COMORBIDITY PRESENT: ICD-10-CM

## 2021-08-31 PROCEDURE — 99204 OFFICE O/P NEW MOD 45 MIN: CPT | Performed by: PHYSICIAN ASSISTANT

## 2021-08-31 RX ORDER — DICLOFENAC SODIUM 75 MG/1
75 TABLET, DELAYED RELEASE ORAL 2 TIMES DAILY
COMMUNITY
End: 2021-09-10 | Stop reason: SDUPTHER

## 2021-08-31 NOTE — PROGRESS NOTES
[facility] Note    Subjective     Chief Complaint   Patient presents with   • Left Hand - Pain   • Right Hand - Pain        HPI  Gavi Nye is a 76 y.o. female. Right hand dominant. New patient presents for evaluation of bilateral hand pain with numbness and tingling primarily in right hand (thumb-middle finger and starting to go into ring finger).  Symptoms/pain have been ongoing for over 4 months and worsening.  Positive nighttime symptoms that waken her.  Constant numbness/tingling.    Minimal issues with left hand.    Pain scale: 5-10/10.  Severity of the pain severe.  Quality of the pain throbbing, aching, stabbing, shooting.  Associated symptoms swelling and stiffness.  Activity related to pain gripping, grasping, attempting to hold objects.  Pain relieved by nothing.  Has tried braces without benefit.  Using Aspercreme now with some help.  Tried Voltaren gel, no help.  Tried PO prednisone, no help.    No reported mechanical symptoms, such as locking or catching.    Denies fever, chills, night sweats or other constitutional symptoms. Pre-diabetic.      Past Medical History:   Diagnosis Date   • Anemia    • Arthritis    • Asthma    • Cholelithiasis    • Elevated fasting glucose 08/29/2019   • Hearing loss    • Hypertriglyceridemia 08/29/2019   • Migraine    • Pneumonia    • Prediabetes 08/29/2019   • Wears glasses    • Wears hearing aid       Past Surgical History:   Procedure Laterality Date   • CATARACT EXTRACTION, BILATERAL     • EYE SURGERY Bilateral     cataract surgery   • KNEE ARTHROSCOPY Left    • PARTIAL KNEE ARTHROPLASTY Left    • ROTATOR CUFF REPAIR Left    • TONSILLECTOMY     • TOTAL ABDOMINAL HYSTERECTOMY WITH SALPINGO OOPHORECTOMY        Family History   Problem Relation Age of Onset   • Arthritis Mother    • Cancer Mother    • Migraines Mother    • Osteoporosis Mother    • Heart attack Father    • Cancer Father    • Thyroid disease Daughter      Social History      Socioeconomic History   • Marital status:      Spouse name: Yoav Nye   • Number of children: Not on file   • Years of education: Not on file   • Highest education level: Not on file   Tobacco Use   • Smoking status: Former Smoker     Packs/day: 1.00     Years: 20.00     Pack years: 20.00     Types: Cigarettes     Start date: 1998     Quit date: 2001     Years since quittin.0   • Smokeless tobacco: Never Used   Substance and Sexual Activity   • Alcohol use: Yes     Alcohol/week: 2.0 - 4.0 standard drinks     Types: 2 - 4 Glasses of wine per week     Comment: soical    • Drug use: No   • Sexual activity: Not Currently     Partners: Male     Birth control/protection: None      Current Outpatient Medications on File Prior to Visit   Medication Sig Dispense Refill   • Cholecalciferol (VITAMIN D) 1000 units tablet Vitamin D     • diclofenac (VOLTAREN) 75 MG EC tablet Take 75 mg by mouth 2 (Two) Times a Day.     • Imitrex 50 MG tablet Take 1 tablet by mouth As Needed for Migraine for up to 1 dose. May repeat dose once in 2 hours if needed 27 tablet 0   • Omega-3 Fatty Acids (FISH OIL) 1000 MG capsule capsule Fish Oil     • pantoprazole (PROTONIX) 40 MG EC tablet Take 1 tablet by mouth Daily. 90 tablet 0   • pravastatin (PRAVACHOL) 80 MG tablet Take 1 tablet by mouth Every Night. 90 tablet 3   • predniSONE (DELTASONE) 10 MG tablet Take 1 tablet by mouth Daily for 7 days. 7 tablet 0   • propranolol LA (INDERAL LA) 80 MG 24 hr capsule Take 1 capsule by mouth Daily. 90 capsule 3   • trolamine salicylate (ASPERCREME) 10 % cream Apply  topically to the appropriate area as directed As Needed for Muscle / Joint Pain.       No current facility-administered medications on file prior to visit.      No Known Allergies     The following portions of the patient's history were reviewed and updated as appropriate: allergies, current medications, past family history, past medical history, past social history,  "past surgical history and problem list.    Review of Systems   Constitutional: Negative.    HENT: Negative.    Eyes: Negative.    Respiratory: Negative.    Cardiovascular: Negative.    Gastrointestinal: Negative.    Endocrine: Negative.    Genitourinary: Negative.    Musculoskeletal: Positive for arthralgias.   Skin: Negative.    Allergic/Immunologic: Negative.    Neurological: Negative.    Hematological: Negative.    Psychiatric/Behavioral: Negative.         Objective      Physical Exam  Pulse 87   Ht 157.5 cm (62.01\")   Wt 93.3 kg (205 lb 11 oz)   SpO2 97%   BMI 37.61 kg/m²     Body mass index is 37.61 kg/m².    GENERAL APPEARANCE: awake, alert & oriented x 3, in no acute distress and well developed, well nourished  PSYCH: normal mood and affect  LUNGS:  breathing nonlabored, no wheezing  EYES: sclera anicteric, pupils equal  CARDIOVASCULAR: palpable pulses. Capillary refill less than 2 seconds  INTEGUMENTARY: skin intact, no clubbing, cyanosis  NEUROLOGIC:  Normal Sensation         Ortho Exam  Peripheral Vascular   Bilateral Upper Extremity    No cyanotic nail beds    Pink nail beds and rapid capillary refill   Palpation    Radial Pulse - Bilaterally normal    Neurologic   Sensory: Light touch intact- Right and left hand    Left Upper Extremity    Left wrist extensors: 5/5    Left wrist flexors: 5/5    Left intrinsics: 5/5      Right Upper Extremity    Right wrist extensors: 5/5    Right wrist flexors: 5/5    Right intrinsics: 5/5    Musculoskeletal   Left Elbow    Forearm supination: AROM - 90 degrees    Forearm pronation: AROM - 90 degrees   Right Elbow    Forearm supination: AROM - 90 degrees    Forearm pronation: AROM - 90 degrees     Inspection and Palpation   Right Wrist      Tenderness - none    Swelling - none    Crepitus - none    Muscle tone - no atrophy   Left Wrist    Tenderness - none    Swelling - none    Crepitus - none    Muscle tone - no atrophy     ROJM:   Left Wrist    Flexion: AROM - 90 " degrees    Extension: AROM - 90 degrees   Right Wrist    Flexion: AROM - 90 degrees    Extension: AROM - 90 degrees     Deformities, Malalignments, Discrepancies    None     Functional Testing   Right    Tinel's Sign-- negative    Phalen's Sign--negative    Carpal Compression Test--positive    Thenar wasting--minimal    APB--4+/5    Elbow Flexion test--negative    Cubital tunnel signs--negative   Left     Tinel's Sign--negative    Phalen's Sign--negative    Carpal Compression Test-- positive    Thenar Wasting--minimal    APB--4+/5    Elbow Flexion test--negative    Cubital tunnel signs--negative       Strength and Tone    Right  strength: good    Left  strength: good     Hand Exam:    Peripheral Vascular   Bilateral Upper Extremity    No cyanotic nail beds    Pink nail beds and rapid capillary refill   Palpation    Radial Pulse - Bilaterally normal    Neurologic   Sensory: Light touch intact- Right and left hand    Left Upper Extremity    Left wrist extensors: 5/5    Left wrist flexors: 5/5    Left intrinsics: 4+/5   Right Upper Extremity    Right wrist extensors: 5/5    Right wrist flexors: 5/5    Right intrinsics: 4/5    Musculoskeletal     Inspection and Palpation   Right/Left Wrist      Tenderness -positive tenderness right, slight discomfort left    Swelling - none    Crepitus - none    Muscle tone - bilateral thenar     Functional Testing:  RIGHT>>LEFT   Right Wrist and Thumb      Finklestein's:  Negative    CMC shuck:  Positive    CMC grind:  Positive    CMC tenderness: Positive    A1 pulley:  Negative    Tinel's Sign--Positive    Phalen's Sign--Positive    Carpal Compression Test-- Positive    Thenar Wasting--moderate to severe    APB--3+/5    Elbow Flexion test--negative    Cubital tunnel signs--negative     Left Wrist and Thumb     Finklestein's:  Negative     CMC shuck:  Positive    CMC grind:  Positive    CMC tenderness: Positive    A1 pulley:  Negative    Tinel's Sign--Negative    Phalen's  Sign--Negative    Carpal Compression Test--Negative    Thenar Wasting--Moderate     APB--4/5    Elbow Flexion test--negative    Cubital tunnel signs--negative       Strength and Tone    Right  strength: weak    Left  strength: fair     Hand Exam:  Difficulty making composite fists, very loose fists only. Positive Heberden's nodules bilateral hands.      Imaging/Studies  Ordered bilateral hands plain films.  Imaging read/interpreted by Dr. Vera.    Imaging Results (Last 7 Days)     Procedure Component Value Units Date/Time    XR Hand 3+ View Bilateral [[id number]] Resulted: 08/31/21 1245     Updated: 08/31/21 1250    Narrative:      Bilateral Hand X-Ray    Indication: Pain    Views:  AP, Lateral, and Oblique     Comparison: None    Findings:  No fracture  No bony lesion  Normal soft tissues  Severe degenerative changes are noted throughout the hand at the IP   joints.  There is severe basilar thumb arthritis bilaterally as well.    Impression:   Severe degenerative changes throughout both hands.  No acute bony   abnormality noted.        Reviewed EMG/NCS performed on 8/13/2021.  Right median nerve: Sensory latency 2.9 ms.  Motor latency 5.9 ms.  Left median nerve: Sensory latency 3.1 ms.  Motor latency 4.0 ms.  Right ulnar nerve: Sensory conduction velocity 43 8 m/s.  Right--chronic denervation APB.  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  Ulnar neuropathy on the right, exceedingly mild.  Assessment/Plan        ICD-10-CM ICD-9-CM   1. Bilateral hand pain  M79.641 729.5    M79.642    2. Right carpal tunnel syndrome  G56.01 354.0   3. Primary osteoarthritis of first carpometacarpal joint of right hand  M18.11 715.14   4. Primary osteoarthritis of first carpometacarpal joint of left hand  M18.12 715.14   5. Primary osteoarthritis of both hands  M19.041 715.14    M19.042        Orders Placed This Encounter   Procedures   • XR Hand 3+ View Bilateral        -Bilateral  hand pain due severe bilateral thumb CMC osteoarthritis and throughout hands/fingers.  Currently right is extremely symptomatic.  -Right carpal tunnel syndrome.  -After long discussion with patient and daughter, patient would like both right thumb CMC pain and carpal tunnel syndrome addressed.  -Refer to hand surgeon (Dr. Chase) for further evaluation and treatment.  -Obesity--BMI 37.61. Patient understands importance of diet, portion control and exercise to assist in weight loss.  -May continue with bracing and Aspercreme for now.  History gastritis/ulcer so instructed by GI not to take NSAIDs.  -Follow up our clinic as needed.  -Questions and concerns answered.    Patient was also examined by Dr. Vera and he agrees with the above assessment and plan.    Medical Decision Making  Management Options : over-the-counter medicine  Data/Risk: radiology tests, EMG/NCS tests       Viv Villarreal PA-C  08/31/21  22:30 EDT               EMR Dragon/Transcription disclaimer:  Much of this encounter note is an electronic transcription of spoken language to printed text. Electronic transcription of spoken language may permit erroneous, or at times, nonsensical words or phrases to be inadvertently transcribed. Although I have reviewed the note for such errors, some may still exist.

## 2021-09-02 ENCOUNTER — TELEPHONE (OUTPATIENT)
Dept: ORTHOPEDIC SURGERY | Facility: CLINIC | Age: 76
End: 2021-09-02

## 2021-09-10 ENCOUNTER — OFFICE VISIT (OUTPATIENT)
Dept: FAMILY MEDICINE CLINIC | Facility: CLINIC | Age: 76
End: 2021-09-10

## 2021-09-10 VITALS
DIASTOLIC BLOOD PRESSURE: 80 MMHG | BODY MASS INDEX: 37.06 KG/M2 | OXYGEN SATURATION: 96 % | SYSTOLIC BLOOD PRESSURE: 118 MMHG | HEART RATE: 70 BPM | WEIGHT: 201.4 LBS | HEIGHT: 62 IN

## 2021-09-10 DIAGNOSIS — Z87.19 H/O GASTRITIS: ICD-10-CM

## 2021-09-10 DIAGNOSIS — R73.03 PRE-DIABETES: Primary | ICD-10-CM

## 2021-09-10 DIAGNOSIS — M17.11 PRIMARY OSTEOARTHRITIS OF RIGHT KNEE: ICD-10-CM

## 2021-09-10 DIAGNOSIS — G56.01 RIGHT CARPAL TUNNEL SYNDROME: ICD-10-CM

## 2021-09-10 DIAGNOSIS — M19.042 PRIMARY OSTEOARTHRITIS OF BOTH HANDS: ICD-10-CM

## 2021-09-10 DIAGNOSIS — M19.041 PRIMARY OSTEOARTHRITIS OF BOTH HANDS: ICD-10-CM

## 2021-09-10 LAB — HBA1C MFR BLD: 5.7 %

## 2021-09-10 PROCEDURE — 99214 OFFICE O/P EST MOD 30 MIN: CPT | Performed by: FAMILY MEDICINE

## 2021-09-10 RX ORDER — PANTOPRAZOLE SODIUM 40 MG/1
40 TABLET, DELAYED RELEASE ORAL DAILY
Qty: 90 TABLET | Refills: 3 | Status: SHIPPED | OUTPATIENT
Start: 2021-09-10 | End: 2023-03-06 | Stop reason: SDUPTHER

## 2021-09-10 RX ORDER — DICLOFENAC SODIUM 75 MG/1
75 TABLET, DELAYED RELEASE ORAL 2 TIMES DAILY PRN
Qty: 180 TABLET | Refills: 3 | Status: SHIPPED | OUTPATIENT
Start: 2021-09-10 | End: 2022-11-07

## 2021-09-10 NOTE — PROGRESS NOTES
"Chief Complaint  Prediabetes (Pt is here for a 3 month followup. )    Subjective          Gavi Nye presents to [facility]  History of Present Illness    She eats a lot of watermelon, cucumbers and tomatoes. Salads with less dressing. Tries to avoid chocolate and cheese.     She will see hand specialist next week. Severe thumb arthritis. Carpal tunnel with numbness. She can't do daily activities running a vacuum.   Left hand worse after NCS with more swelling. She is thinking about topical hemp cream.     She has left knee pain, she would like to avoid surgery. She had meniscus and knee replacement left knee.         Objective   Vital Signs:   /80   Pulse 70   Ht 157.5 cm (62.01\")   Wt 91.4 kg (201 lb 6.4 oz)   SpO2 96%   BMI 36.83 kg/m²     Physical Exam  Vitals reviewed.   Constitutional:       General: She is not in acute distress.     Appearance: She is obese. She is not ill-appearing.   HENT:      Ears:      Comments: Decreased hearing  Cardiovascular:      Rate and Rhythm: Normal rate and regular rhythm.   Pulmonary:      Effort: Pulmonary effort is normal.      Breath sounds: Normal breath sounds.   Musculoskeletal:      Comments: Bilateral hand swelling   Neurological:      Mental Status: She is alert.   Psychiatric:         Mood and Affect: Mood normal.        Result Review :   The following data was reviewed by: Raven Douglas MD on 09/10/2021:  Common labs    Common Labsle 6/10/21 6/10/21 6/10/21 6/10/21 6/10/21 7/9/21 9/10/21    1111 1111 1111 1111 1111     Glucose 89         BUN 26 (A)         Creatinine 0.65         eGFR Non African Am 88         Sodium 142         Potassium 4.3         Chloride 105         Calcium 10.3         Albumin 3.90         Total Bilirubin 0.2         Alkaline Phosphatase 123 (A)         AST (SGOT) 12         ALT (SGPT) 9         WBC     5.05 4.98    Hemoglobin     13.8 9.6 (A)    Hematocrit     42.7 29.9 (A)    Platelets  "    372 363    Total Cholesterol  184        Triglycerides  133        HDL Cholesterol  44        LDL Cholesterol   116 (A)        Hemoglobin A1C    6.00 (A)   5.7   Uric Acid   4.1       (A) Abnormal value                      Assessment and Plan    Diagnoses and all orders for this visit:    1. Pre-diabetes (Primary)  -     POC Glycosylated Hemoglobin (Hb A1C)  A1c improving with dietary changes.  Of note patient has been on prednisone recently but A1c is still improving.  Recheck in 3 months.  2. Right carpal tunnel syndrome  Patient has upcoming appointment with orthopedics if she would like to see someone that can help her carpal tunnel as well as her arthritis of the hand.  3. Primary osteoarthritis of both hands  -     diclofenac (VOLTAREN) 75 MG EC tablet; Take 1 tablet by mouth 2 (Two) Times a Day As Needed (arthritis).  Dispense: 180 tablet; Refill: 3  Patient has upcoming appointment with orthopedics if she would like to see someone that can help her carpal tunnel as well as her arthritis of the hand.  Discussed GI protection with using PPI daily along with the NSAIDs.  4. H/O gastritis  -     pantoprazole (PROTONIX) 40 MG EC tablet; Take 1 tablet by mouth Daily.  Dispense: 90 tablet; Refill: 3  Discussed GI protection with using PPI daily along with the NSAIDs.  5. Primary osteoarthritis of right knee  -     Ambulatory Referral to Orthopedic Surgery  She would like consultation with her previous orthopedic surgeon.      Follow Up   Return in about 3 months (around 12/10/2021) for Office visit prediabetes.  Patient was given instructions and counseling regarding her condition or for health maintenance advice. Please see specific information pulled into the AVS if appropriate.     Electronically signed by Raven Douglas MD, 09/10/21, 12:43 PM EDT.

## 2021-09-16 ENCOUNTER — TELEPHONE (OUTPATIENT)
Dept: FAMILY MEDICINE CLINIC | Facility: CLINIC | Age: 76
End: 2021-09-16

## 2021-10-08 ENCOUNTER — IMMUNIZATION (OUTPATIENT)
Dept: FAMILY MEDICINE CLINIC | Facility: CLINIC | Age: 76
End: 2021-10-08

## 2021-10-08 DIAGNOSIS — Z23 IMMUNIZATION DUE: Primary | ICD-10-CM

## 2021-10-08 PROCEDURE — 0001A COVID-19 (PFIZER): CPT | Performed by: FAMILY MEDICINE

## 2021-10-08 PROCEDURE — 0004A COVID-19 (PFIZER): CPT | Performed by: FAMILY MEDICINE

## 2021-10-08 PROCEDURE — 91300 COVID-19 (PFIZER): CPT | Performed by: FAMILY MEDICINE

## 2021-10-26 DIAGNOSIS — G43.009 MIGRAINE WITHOUT AURA AND WITHOUT STATUS MIGRAINOSUS, NOT INTRACTABLE: ICD-10-CM

## 2021-10-26 RX ORDER — SUMATRIPTAN SUCCINATE 50 MG
50 TABLET ORAL AS NEEDED
Qty: 27 TABLET | Refills: 0 | Status: CANCELLED | OUTPATIENT
Start: 2021-10-26

## 2021-10-26 NOTE — TELEPHONE ENCOUNTER
Caller: Yoav Nye    Relationship: Emergency Contact    Requested Prescriptions:   Requested Prescriptions     Pending Prescriptions Disp Refills   • Imitrex 50 MG tablet 27 tablet 0     Sig: Take 1 tablet by mouth As Needed for Migraine for up to 1 dose. May repeat dose once in 2 hours if needed        Pharmacy where request should be sent: [facility] #[id number] [place] [address] - [phone] [facility] [phone]     Additional details provided by patient: THIS PRESCRIPTION NEEDS A PRIOR AUTHORIZATION.  ALSO SHE HAS A FLARE UP OF FUNGUS ON HER FOOT AND SOME ON HER HAND SO SHE IS REQUESTING AN ANTIFUNGAL MEDICATION. THE OVER THE COUNTER IS NOT WORKING.    Best call back number: [phone]    Does the patient have less than a 3 day supply:  [x] Yes  [] No    Daniela Dillard, PCT   10/26/21 13:39 EDT

## 2021-10-28 ENCOUNTER — OFFICE VISIT (OUTPATIENT)
Dept: FAMILY MEDICINE CLINIC | Facility: CLINIC | Age: 76
End: 2021-10-28

## 2021-10-28 VITALS
DIASTOLIC BLOOD PRESSURE: 70 MMHG | BODY MASS INDEX: 35.96 KG/M2 | HEIGHT: 62 IN | HEART RATE: 66 BPM | OXYGEN SATURATION: 98 % | WEIGHT: 195.4 LBS | SYSTOLIC BLOOD PRESSURE: 118 MMHG

## 2021-10-28 DIAGNOSIS — L60.3 DYSTROPHIC NAIL: Primary | ICD-10-CM

## 2021-10-28 PROCEDURE — 99213 OFFICE O/P EST LOW 20 MIN: CPT | Performed by: FAMILY MEDICINE

## 2021-10-28 RX ORDER — VENLAFAXINE 100 MG/1
TABLET ORAL
COMMUNITY

## 2021-10-28 RX ORDER — TERBINAFINE HYDROCHLORIDE 250 MG/1
250 TABLET ORAL DAILY
Qty: 90 TABLET | Refills: 0 | Status: SHIPPED | OUTPATIENT
Start: 2021-10-28 | End: 2022-04-18

## 2021-10-28 NOTE — PROGRESS NOTES
"Chief Complaint  Nail Problem (Pt states she has some fungus on her toes and finger nails. )    Subjective          Gavi Nye presents to [facility]  History of Present Illness    Right middle finger and left thumb with thick nail and growing in painful over a year. Started on left toes, improved but now spread to right great toe. She used to get pedicures before COVID. Tried 2 OTC products without improvement.       Objective   Vital Signs:   /70   Pulse 66   Ht 157.5 cm (62.01\")   Wt 88.6 kg (195 lb 6.4 oz)   SpO2 98%   BMI 35.73 kg/m²     Physical Exam  Constitutional:       General: She is not in acute distress.     Appearance: She is obese. She is not ill-appearing.   Skin:     Comments: Right middle finger and left thumb dystrophic fingernails with abnormally thick and debris under nail bed. Right great toe and right third toe thickened discolored toenails   Neurological:      Mental Status: She is alert.        Result Review :   The following data was reviewed by: Raven Douglas MD on 10/28/2021:  CMP    CMP 6/10/21   Glucose 89   BUN 26 (A)   Creatinine 0.65   eGFR Non African Am 88   Sodium 142   Potassium 4.3   Chloride 105   Calcium 10.3   Albumin 3.90   Total Bilirubin 0.2   Alkaline Phosphatase 123 (A)   AST (SGOT) 12   ALT (SGPT) 9   (A) Abnormal value                      Assessment and Plan    Diagnoses and all orders for this visit:    1. Dystrophic nail (Primary)  -     terbinafine (LamISIL) 250 MG tablet; Take 1 tablet by mouth Daily for 90 days.  Dispense: 90 tablet; Refill: 0    New. Failed over-the-counter treatment. Discussed risk and benefits of Lamisil.      Follow Up   Return in about 7 months (around 6/11/2022) for AWV and fasting labs.  Patient was given instructions and counseling regarding her condition or for health maintenance advice. Please see specific information pulled into the AVS if appropriate.     Electronically signed by " Raven Douglas MD, 10/28/21, 11:50 AM EDT.

## 2021-11-10 ENCOUNTER — TELEPHONE (OUTPATIENT)
Dept: FAMILY MEDICINE CLINIC | Facility: CLINIC | Age: 76
End: 2021-11-10

## 2021-11-10 DIAGNOSIS — G43.009 MIGRAINE WITHOUT AURA AND WITHOUT STATUS MIGRAINOSUS, NOT INTRACTABLE: ICD-10-CM

## 2021-11-10 RX ORDER — SUMATRIPTAN SUCCINATE 50 MG
50 TABLET ORAL AS NEEDED
Qty: 27 TABLET | Refills: 0 | Status: SHIPPED | OUTPATIENT
Start: 2021-11-10 | End: 2022-07-20

## 2021-11-10 NOTE — TELEPHONE ENCOUNTER
Caller: Yoav Nye    Relationship: Emergency Contact    Best call back number:[phone]    Requested Prescriptions:   Requested Prescriptions     Pending Prescriptions Disp Refills   • Imitrex 50 MG tablet 27 tablet 0     Sig: Take 1 tablet by mouth As Needed for Migraine for up to 1 dose. May repeat dose once in 2 hours if needed        Pharmacy where request should be sent: [facility] #[id number] [place] [address] AT [address] - [phone] [facility] [phone] [place]     Additional details provided by patient: WOULD NEED PRIOR AUTHORIZATION AS WELL    Does the patient have less than a 3 day supply:  [x] Yes  [] No    Norbert Austin Rep   11/10/21 12:47 EST

## 2021-11-10 NOTE — TELEPHONE ENCOUNTER
Rx Refill Note  Requested Prescriptions     Pending Prescriptions Disp Refills   • Imitrex 50 MG tablet 27 tablet 0     Sig: Take 1 tablet by mouth As Needed for Migraine for up to 1 dose. May repeat dose once in 2 hours if needed      Last office visit with prescribing clinician: 10/28/2021      Next office visit with prescribing clinician: 6/16/2022            Nichole Fajardo MA  11/10/21, 13:02 EST     Attempted to completed PA, message states not needed at this time. Contacted patient's  to advise him that medicine should be available for pickup from pharmacy. He verbalized understanding and agreed

## 2022-04-18 DIAGNOSIS — L60.3 DYSTROPHIC NAIL: ICD-10-CM

## 2022-04-18 RX ORDER — TERBINAFINE HYDROCHLORIDE 250 MG/1
TABLET ORAL
Qty: 90 TABLET | Refills: 0 | Status: SHIPPED | OUTPATIENT
Start: 2022-04-18 | End: 2023-03-06

## 2022-04-29 DIAGNOSIS — G43.009 MIGRAINE WITHOUT AURA AND WITHOUT STATUS MIGRAINOSUS, NOT INTRACTABLE: ICD-10-CM

## 2022-04-30 RX ORDER — PROPRANOLOL HYDROCHLORIDE 120 MG/1
120 CAPSULE, EXTENDED RELEASE ORAL DAILY
Qty: 90 CAPSULE | Refills: 0 | Status: SHIPPED | OUTPATIENT
Start: 2022-04-30 | End: 2023-03-06 | Stop reason: SDUPTHER

## 2022-04-30 NOTE — TELEPHONE ENCOUNTER
Rx Refill Note  Requested Prescriptions     Pending Prescriptions Disp Refills   • propranolol LA (INDERAL LA) 120 MG 24 hr capsule [Pharmacy Med Name: PROPRANOLOL ER 120MG CAPSULES] 90 capsule 0     Sig: TAKE 1 CAPSULE BY MOUTH DAILY      Last office visit with prescribing clinician: 10/28/2021      Next office visit with prescribing clinician: 6/16/2022            Josy So MA  04/30/22, 11:27 EDT

## 2022-07-20 ENCOUNTER — TELEPHONE (OUTPATIENT)
Dept: FAMILY MEDICINE CLINIC | Facility: CLINIC | Age: 77
End: 2022-07-20

## 2022-07-20 RX ORDER — ZOLMITRIPTAN 2.5 MG/1
2.5 TABLET, FILM COATED ORAL ONCE AS NEEDED
Qty: 9 TABLET | Refills: 3 | Status: SHIPPED | OUTPATIENT
Start: 2022-07-20 | End: 2022-09-26

## 2022-09-26 ENCOUNTER — TELEPHONE (OUTPATIENT)
Dept: FAMILY MEDICINE CLINIC | Facility: CLINIC | Age: 77
End: 2022-09-26

## 2022-09-26 RX ORDER — ELETRIPTAN HYDROBROMIDE 40 MG/1
40 TABLET, FILM COATED ORAL ONCE AS NEEDED
Qty: 9 TABLET | Refills: 5 | Status: SHIPPED | OUTPATIENT
Start: 2022-09-26 | End: 2023-03-06 | Stop reason: SDUPTHER

## 2022-09-26 NOTE — TELEPHONE ENCOUNTER
Caller: Yoav Nye    Relationship: Emergency Contact    Best call back number: [phone]  What medications are you currently taking:   Current Outpatient Medications on File Prior to Visit   Medication Sig Dispense Refill   • Cholecalciferol (VITAMIN D) 1000 units tablet Vitamin D     • diclofenac (VOLTAREN) 75 MG EC tablet Take 1 tablet by mouth 2 (Two) Times a Day As Needed (arthritis). 180 tablet 3   • Omega-3 Fatty Acids (FISH OIL) 1000 MG capsule capsule Fish Oil     • pantoprazole (PROTONIX) 40 MG EC tablet Take 1 tablet by mouth Daily. 90 tablet 3   • pravastatin (PRAVACHOL) 80 MG tablet Take 1 tablet by mouth Every Night. 90 tablet 3   • propranolol LA (INDERAL LA) 120 MG 24 hr capsule TAKE 1 CAPSULE BY MOUTH DAILY 90 capsule 0   • propranolol LA (INDERAL LA) 80 MG 24 hr capsule Take 1 capsule by mouth Daily. 90 capsule 3   • terbinafine (lamiSIL) 250 MG tablet TAKE 1 TABLET BY MOUTH DAILY 90 tablet 0   • trolamine salicylate (ASPERCREME) 10 % cream Apply  topically to the appropriate area as directed As Needed for Muscle / Joint Pain.     • venlafaxine (EFFEXOR) 100 MG tablet venlafaxine     • ZOLMitriptan (Zomig) 2.5 MG tablet Take 1 tablet by mouth 1 (One) Time As Needed for Migraine (may repeat in 2 hours x1 dose). 9 tablet 3     No current facility-administered medications on file prior to visit.            Which medication are you concerned about: ZOLMITRIPAN    Who prescribed you this medication: DR. JOSEFA SANDS    What are your concerns: THE IMMITREX IS NOT COVERD BY INSRUANCE ANYMORE AND THE ZOLMITRIPTAN IS NOT WORKING, COULD WE PLEASE CALL IN SOMETHING ELSE THAT MAY WORK. PLEASE CALL BACK WITH ANY QUESTOINS.     How long have you had these concerns: SINCE PRESCRIBED AND SAW IT WAS NOT WORKING.

## 2022-09-26 NOTE — TELEPHONE ENCOUNTER
Attempted to contact no answer. Left detailed voicemail informing pt that a new medication was sent to her pharmacy & if she had any additional questions to call the office.

## 2022-11-07 DIAGNOSIS — M19.042 PRIMARY OSTEOARTHRITIS OF BOTH HANDS: ICD-10-CM

## 2022-11-07 DIAGNOSIS — M19.041 PRIMARY OSTEOARTHRITIS OF BOTH HANDS: ICD-10-CM

## 2022-11-07 RX ORDER — DICLOFENAC SODIUM 75 MG/1
TABLET, DELAYED RELEASE ORAL
Qty: 180 TABLET | Refills: 1 | Status: SHIPPED | OUTPATIENT
Start: 2022-11-07

## 2022-11-07 NOTE — TELEPHONE ENCOUNTER
Rx Refill Note  Requested Prescriptions     Pending Prescriptions Disp Refills   • diclofenac (VOLTAREN) 75 MG EC tablet [Pharmacy Med Name: DICLOFENAC SODIUM 75MG DR TABLETS] 180 tablet 3     Sig: TAKE 1 TABLET BY MOUTH TWICE DAILY AS NEEDED FOR ARTHRITIS      Last office visit with prescribing clinician: 10/28/2021      Next office visit with prescribing clinician: 12/22/2022            Alice Clark MA  11/07/22, 11:30 EST

## 2023-03-06 ENCOUNTER — LAB (OUTPATIENT)
Dept: LAB | Facility: HOSPITAL | Age: 78
End: 2023-03-06
Payer: MEDICARE

## 2023-03-06 ENCOUNTER — OFFICE VISIT (OUTPATIENT)
Dept: FAMILY MEDICINE CLINIC | Facility: CLINIC | Age: 78
End: 2023-03-06
Payer: MEDICARE

## 2023-03-06 VITALS
DIASTOLIC BLOOD PRESSURE: 70 MMHG | HEIGHT: 62 IN | OXYGEN SATURATION: 95 % | HEART RATE: 60 BPM | WEIGHT: 200.6 LBS | SYSTOLIC BLOOD PRESSURE: 140 MMHG | BODY MASS INDEX: 36.91 KG/M2

## 2023-03-06 DIAGNOSIS — Z13.0 SCREENING FOR DEFICIENCY ANEMIA: ICD-10-CM

## 2023-03-06 DIAGNOSIS — Z13.820 ENCOUNTER FOR SCREENING FOR OSTEOPOROSIS: ICD-10-CM

## 2023-03-06 DIAGNOSIS — E78.2 MIXED HYPERLIPIDEMIA: ICD-10-CM

## 2023-03-06 DIAGNOSIS — M19.042 PRIMARY OSTEOARTHRITIS OF BOTH HANDS: ICD-10-CM

## 2023-03-06 DIAGNOSIS — Z87.19 H/O GASTRITIS: ICD-10-CM

## 2023-03-06 DIAGNOSIS — R73.03 PREDIABETES: Chronic | ICD-10-CM

## 2023-03-06 DIAGNOSIS — Z78.0 MENOPAUSE: ICD-10-CM

## 2023-03-06 DIAGNOSIS — M19.041 PRIMARY OSTEOARTHRITIS OF BOTH HANDS: ICD-10-CM

## 2023-03-06 DIAGNOSIS — Z00.00 MEDICARE ANNUAL WELLNESS VISIT, SUBSEQUENT: Primary | ICD-10-CM

## 2023-03-06 DIAGNOSIS — Z91.81 AT MODERATE RISK FOR FALL: ICD-10-CM

## 2023-03-06 DIAGNOSIS — G43.009 MIGRAINE WITHOUT AURA AND WITHOUT STATUS MIGRAINOSUS, NOT INTRACTABLE: ICD-10-CM

## 2023-03-06 LAB
ALBUMIN SERPL-MCNC: 4.4 G/DL (ref 3.5–5.2)
ALBUMIN/GLOB SERPL: 1.5 G/DL
ALP SERPL-CCNC: 143 U/L (ref 39–117)
ALT SERPL W P-5'-P-CCNC: 13 U/L (ref 1–33)
ANION GAP SERPL CALCULATED.3IONS-SCNC: 6 MMOL/L (ref 5–15)
AST SERPL-CCNC: 15 U/L (ref 1–32)
BILIRUB SERPL-MCNC: 0.4 MG/DL (ref 0–1.2)
BUN SERPL-MCNC: 25 MG/DL (ref 8–23)
BUN/CREAT SERPL: 30.9 (ref 7–25)
CALCIUM SPEC-SCNC: 10.1 MG/DL (ref 8.6–10.5)
CHLORIDE SERPL-SCNC: 107 MMOL/L (ref 98–107)
CHOLEST SERPL-MCNC: 241 MG/DL (ref 0–200)
CO2 SERPL-SCNC: 29 MMOL/L (ref 22–29)
CREAT SERPL-MCNC: 0.81 MG/DL (ref 0.57–1)
DEPRECATED RDW RBC AUTO: 44.8 FL (ref 37–54)
EGFRCR SERPLBLD CKD-EPI 2021: 73.9 ML/MIN/1.73
ERYTHROCYTE [DISTWIDTH] IN BLOOD BY AUTOMATED COUNT: 14.1 % (ref 12.3–15.4)
EXPIRATION DATE: NORMAL
GLOBULIN UR ELPH-MCNC: 2.9 GM/DL
GLUCOSE SERPL-MCNC: 94 MG/DL (ref 65–99)
HBA1C MFR BLD: 5.6 %
HCT VFR BLD AUTO: 41.8 % (ref 34–46.6)
HDLC SERPL-MCNC: 50 MG/DL (ref 40–60)
HGB BLD-MCNC: 14 G/DL (ref 12–15.9)
LDLC SERPL CALC-MCNC: 158 MG/DL (ref 0–100)
LDLC/HDLC SERPL: 3.1 {RATIO}
Lab: NORMAL
MCH RBC QN AUTO: 29.4 PG (ref 26.6–33)
MCHC RBC AUTO-ENTMCNC: 33.5 G/DL (ref 31.5–35.7)
MCV RBC AUTO: 87.8 FL (ref 79–97)
PLATELET # BLD AUTO: 218 10*3/MM3 (ref 140–450)
PMV BLD AUTO: 10.4 FL (ref 6–12)
POTASSIUM SERPL-SCNC: 4.9 MMOL/L (ref 3.5–5.2)
PROT SERPL-MCNC: 7.3 G/DL (ref 6–8.5)
RBC # BLD AUTO: 4.76 10*6/MM3 (ref 3.77–5.28)
SODIUM SERPL-SCNC: 142 MMOL/L (ref 136–145)
TRIGL SERPL-MCNC: 181 MG/DL (ref 0–150)
VLDLC SERPL-MCNC: 33 MG/DL (ref 5–40)
WBC NRBC COR # BLD: 4.36 10*3/MM3 (ref 3.4–10.8)

## 2023-03-06 PROCEDURE — 3044F HG A1C LEVEL LT 7.0%: CPT | Performed by: FAMILY MEDICINE

## 2023-03-06 PROCEDURE — 85027 COMPLETE CBC AUTOMATED: CPT

## 2023-03-06 PROCEDURE — 1125F AMNT PAIN NOTED PAIN PRSNT: CPT | Performed by: FAMILY MEDICINE

## 2023-03-06 PROCEDURE — 1170F FXNL STATUS ASSESSED: CPT | Performed by: FAMILY MEDICINE

## 2023-03-06 PROCEDURE — 80053 COMPREHEN METABOLIC PANEL: CPT

## 2023-03-06 PROCEDURE — 80061 LIPID PANEL: CPT

## 2023-03-06 PROCEDURE — 99397 PER PM REEVAL EST PAT 65+ YR: CPT | Performed by: FAMILY MEDICINE

## 2023-03-06 PROCEDURE — 1159F MED LIST DOCD IN RCRD: CPT | Performed by: FAMILY MEDICINE

## 2023-03-06 PROCEDURE — 83036 HEMOGLOBIN GLYCOSYLATED A1C: CPT | Performed by: FAMILY MEDICINE

## 2023-03-06 PROCEDURE — G0439 PPPS, SUBSEQ VISIT: HCPCS | Performed by: FAMILY MEDICINE

## 2023-03-06 RX ORDER — IPRATROPIUM BROMIDE 42 UG/1
SPRAY, METERED NASAL TAKE AS DIRECTED
COMMUNITY
Start: 2023-01-05

## 2023-03-06 RX ORDER — PRAVASTATIN SODIUM 80 MG/1
80 TABLET ORAL NIGHTLY
Qty: 90 TABLET | Refills: 3 | Status: SHIPPED | OUTPATIENT
Start: 2023-03-06

## 2023-03-06 RX ORDER — PROPRANOLOL HYDROCHLORIDE 120 MG/1
120 CAPSULE, EXTENDED RELEASE ORAL DAILY
Qty: 90 CAPSULE | Refills: 3 | Status: SHIPPED | OUTPATIENT
Start: 2023-03-06

## 2023-03-06 RX ORDER — ELETRIPTAN HYDROBROMIDE 40 MG/1
40 TABLET, FILM COATED ORAL ONCE AS NEEDED
Qty: 9 TABLET | Refills: 5 | Status: SHIPPED | OUTPATIENT
Start: 2023-03-06

## 2023-03-06 RX ORDER — PANTOPRAZOLE SODIUM 40 MG/1
40 TABLET, DELAYED RELEASE ORAL DAILY
Qty: 90 TABLET | Refills: 3 | Status: SHIPPED | OUTPATIENT
Start: 2023-03-06

## 2023-03-06 NOTE — PROGRESS NOTES
The ABCs of the Annual Wellness Visit  Subsequent Medicare Wellness Visit    Subjective    Gavi Nye is a 77 y.o. female who presents for a Subsequent Medicare Wellness Visit.    The following portions of the patient's history were reviewed and   updated as appropriate: allergies, current medications, past family history, past medical history, past social history, past surgical history and problem list.    Compared to one year ago, the patient feels her physical   health is the same.    Compared to one year ago, the patient feels her mental   health is the same.    Recent Hospitalizations:  She was not admitted to the hospital during the last year.       Current Medical Providers:  Patient Care Team:  Raven Lou MD as PCP - General (Family Medicine)  Aicha Kasper MD as Consulting Physician (Orthopedic Surgery)  Soco Elizondo CCC-CJ (Audiology)    Outpatient Medications Prior to Visit   Medication Sig Dispense Refill   • Cholecalciferol (VITAMIN D) 1000 units tablet Vitamin D     • diclofenac (VOLTAREN) 75 MG EC tablet TAKE 1 TABLET BY MOUTH TWICE DAILY AS NEEDED FOR ARTHRITIS 180 tablet 1   • ipratropium (ATROVENT) 0.06 % nasal spray Take As Directed.     • Omega-3 Fatty Acids (FISH OIL) 1000 MG capsule capsule Fish Oil     • trolamine salicylate (ASPERCREME) 10 % cream Apply  topically to the appropriate area as directed As Needed for Muscle / Joint Pain.     • venlafaxine (EFFEXOR) 100 MG tablet venlafaxine     • eletriptan (Relpax) 40 MG tablet Take 1 tablet by mouth 1 (One) Time As Needed for Migraine for up to 1 dose. May repeat dose x1 after 2 hr 9 tablet 5   • pantoprazole (PROTONIX) 40 MG EC tablet Take 1 tablet by mouth Daily. 90 tablet 3   • pravastatin (PRAVACHOL) 80 MG tablet Take 1 tablet by mouth Every Night. 90 tablet 3   • propranolol LA (INDERAL LA) 120 MG 24 hr capsule TAKE 1 CAPSULE BY MOUTH DAILY 90 capsule 0   • propranolol LA (INDERAL LA) 80 MG 24 hr capsule Take 1  "capsule by mouth Daily. 90 capsule 3   • terbinafine (lamiSIL) 250 MG tablet TAKE 1 TABLET BY MOUTH DAILY 90 tablet 0     No facility-administered medications prior to visit.       No opioid medication identified on active medication list. I have reviewed chart for other potential  high risk medication/s and harmful drug interactions in the elderly.          Aspirin is not on active medication list.  Aspirin use is not indicated based on review of current medical condition/s. Risk of harm outweighs potential benefits.  .    Patient Active Problem List   Diagnosis   • Migraine without aura and without status migrainosus, not intractable   • Primary osteoarthritis involving multiple joints   • Hot flashes due to menopause   • Bilateral hearing loss   • Chronic rhinitis   • Elevated fasting glucose   • Prediabetes   • Mixed hyperlipidemia   • Right hand paresthesia   • Gastrointestinal hemorrhage   • Right carpal tunnel syndrome   • Primary osteoarthritis of both hands     Advance Care Planning  Advance Directive is on file.  ACP discussion was held with the patient during this visit. Patient has an advance directive in EMR which is still valid.      Objective    Vitals:    03/06/23 1140   BP: 140/70   Pulse: 60   SpO2: 95%   Weight: 91 kg (200 lb 9.6 oz)   Height: 157.5 cm (62\")   PainSc:   2     Estimated body mass index is 36.69 kg/m² as calculated from the following:    Height as of this encounter: 157.5 cm (62\").    Weight as of this encounter: 91 kg (200 lb 9.6 oz).    Class 2 Severe Obesity (BMI >=35 and <=39.9). Obesity-related health conditions include the following: dyslipidemias. Obesity is unchanged. BMI is is above average; BMI management plan is completed. We discussed Mediterranean diet.      Does the patient have evidence of cognitive impairment? Yes  Problems with short term memory. 1/3 word recall with normal clock draw  Lab Results   Component Value Date    HGBA1C 5.6 03/06/2023        HEALTH RISK " ASSESSMENT    Smoking Status:  Social History     Tobacco Use   Smoking Status Former   • Packs/day: 1.00   • Years: 20.00   • Pack years: 20.00   • Types: Cigarettes   • Start date: 1998   • Quit date: 2001   • Years since quittin.6   Smokeless Tobacco Never     Alcohol Consumption:  Social History     Substance and Sexual Activity   Alcohol Use Yes   • Alcohol/week: 2.0 - 4.0 standard drinks   • Types: 2 - 4 Glasses of wine per week    Comment: soical      Fall Risk Screen:    STEADI Fall Risk Assessment was completed, and patient is at MODERATE risk for falls. Assessment completed on:3/6/2023    Depression Screening:  PHQ-2/PHQ-9 Depression Screening 3/6/2023   Little Interest or Pleasure in Doing Things 0-->not at all   Feeling Down, Depressed or Hopeless 0-->not at all   PHQ-9: Brief Depression Severity Measure Score 0       Health Habits and Functional and Cognitive Screening:  Functional & Cognitive Status 3/6/2023   Do you have difficulty preparing food and eating? No   Do you have difficulty bathing yourself, getting dressed or grooming yourself? No   Do you have difficulty using the toilet? No   Do you have difficulty moving around from place to place? Yes   Do you have trouble with steps or getting out of a bed or a chair? Yes   Current Diet Unhealthy Diet   Dental Exam Up to date   Eye Exam Up to date   Exercise (times per week) 0 times per week   Current Exercises Include No Regular Exercise   Current Exercise Activities Include -   Do you need help using the phone?  No   Are you deaf or do you have serious difficulty hearing?  Yes   Do you need help with transportation? No   Do you need help shopping? No   Do you need help preparing meals?  No   Do you need help with housework?  No   Do you need help with laundry? No   Do you need help taking your medications? No   Do you need help managing money? No   Do you ever drive or ride in a car without wearing a seat belt? No   Have you felt  unusual stress, anger or loneliness in the last month? No   Who do you live with? Spouse   If you need help, do you have trouble finding someone available to you? No   Have you been bothered in the last four weeks by sexual problems? No   Do you have difficulty concentrating, remembering or making decisions? Yes       Age-appropriate Screening Schedule:  Refer to the list below for future screening recommendations based on patient's age, sex and/or medical conditions. Orders for these recommended tests are listed in the plan section. The patient has been provided with a written plan.    Health Maintenance   Topic Date Due   • DXA SCAN  Never done   • TDAP/TD VACCINES (1 - Tdap) Never done   • HEPATITIS C SCREENING  Never done   • LIPID PANEL  06/10/2022   • INFLUENZA VACCINE  08/01/2022   • COVID-19 Vaccine (5 - Booster for Pfizer series) 09/15/2022   • HEMOGLOBIN A1C  09/06/2023   • ANNUAL WELLNESS VISIT  03/06/2024   • COLORECTAL CANCER SCREENING  02/09/2026   • Pneumococcal Vaccine 65+  Completed   • ZOSTER VACCINE  Completed              Immunization History   Administered Date(s) Administered   • COVID-19 (MODERNA) 1st, 2nd, 3rd Dose Only 07/21/2022   • COVID-19 (PFIZER) PURPLE CAP 01/26/2021, 02/19/2021, 10/08/2021   • FLUAD TRI 65YR+ 08/29/2019   • Fluad Quad 65+ 10/25/2021   • Fluzone High Dose =>65 Years ([employer] ONLY) 12/18/2020   • Fluzone High-Dose 65+yrs 12/18/2020   • Pneumococcal Conjugate 13-Valent (PCV13) 08/29/2019   • Pneumococcal Polysaccharide (PPSV23) 06/10/2021   • Shingrix 10/25/2021, 01/04/2022         CMS Preventative Services Quick Reference  Risk Factors Identified During Encounter  Chronic Pain: NSAIDs per medication orders.  Hearing Problem: continue hearing aids  The above risks/problems have been discussed with the patient.  Pertinent information has been shared with the patient in the After Visit Summary.  An After Visit Summary and PPPS were made available to the patient.    Follow  "Up:   Next Medicare Wellness visit to be scheduled in 1 year.       Additional E&M Note during same encounter follows:  Patient has multiple medical problems which are significant and separately identifiable that require additional work above and beyond the Medicare Wellness Visit.      Chief Complaint  Medicare Wellness-subsequent and Annual Exam    Subjective        HPI  Gavi Nye is also being seen today for physical, hyperlipidemia, migraines, arthritis         FH mother osteoporosis.     She takes beta blocker for migraines    She was wheezing walking up stairs yesterday.    She has diarrhea and wonders if related to food. Once a month.     Objective   Vital Signs:  /70   Pulse 60   Ht 157.5 cm (62\")   Wt 91 kg (200 lb 9.6 oz)   SpO2 95%   BMI 36.69 kg/m²     Physical Exam  Constitutional:       General: She is not in acute distress.     Appearance: She is obese.   HENT:      Right Ear: Tympanic membrane and ear canal normal. Decreased hearing noted.      Left Ear: Tympanic membrane and ear canal normal. Decreased hearing noted.      Ears:      Comments: Bilateral hearing aids  Eyes:      General:         Right eye: No discharge.         Left eye: No discharge.      Conjunctiva/sclera: Conjunctivae normal.   Neck:      Thyroid: No thyromegaly.   Cardiovascular:      Rate and Rhythm: Normal rate and regular rhythm.   Pulmonary:      Effort: Pulmonary effort is normal.      Breath sounds: Normal breath sounds.   Abdominal:      Palpations: Abdomen is soft. There is no hepatomegaly.      Tenderness: There is no abdominal tenderness.   Musculoskeletal:      Cervical back: Neck supple.   Lymphadenopathy:      Head:      Right side of head: No submandibular, preauricular or posterior auricular adenopathy.      Left side of head: No submandibular, preauricular or posterior auricular adenopathy.      Cervical: No cervical adenopathy.   Skin:     General: Skin is warm.   Neurological:      Mental Status: " She is alert and oriented to person, place, and time.   Psychiatric:         Mood and Affect: Mood normal.         Behavior: Behavior normal.         Thought Content: Thought content normal.         Judgment: Judgment normal.                         Assessment and Plan   Diagnoses and all orders for this visit:    1. Medicare annual wellness visit, subsequent (Primary)    2. Prediabetes  -     Comprehensive Metabolic Panel; Future  -     POC Glycosylated Hemoglobin (Hb A1C)  A1c now in the normal range.  3. Mixed hyperlipidemia  -     Lipid Panel; Future  -     pravastatin (PRAVACHOL) 80 MG tablet; Take 1 tablet by mouth Every Night.  Dispense: 90 tablet; Refill: 3  Has not had her statin medication refilled in some time.  Restart medicine. Mediterranean diet.   4. Encounter for screening for osteoporosis  -     DEXA Bone Density, Axial (Hospital); Future    5. Menopause  -     DEXA Bone Density, Axial (Hospital); Future    6. At moderate risk for fall    7. Screening for deficiency anemia  -     CBC (No Diff); Future    8. Migraine without aura and without status migrainosus, not intractable  -     propranolol LA (INDERAL LA) 120 MG 24 hr capsule; Take 1 capsule by mouth Daily.  Dispense: 90 capsule; Refill: 3  -     eletriptan (Relpax) 40 MG tablet; Take 1 tablet by mouth 1 (One) Time As Needed for Migraine for up to 1 dose. May repeat dose x1 after 2 hr  Dispense: 9 tablet; Refill: 5  Continue propranolol for prevention.  Triptan as needed  9. H/O gastritis  -     pantoprazole (PROTONIX) 40 MG EC tablet; Take 1 tablet by mouth Daily.  Dispense: 90 tablet; Refill: 3  Continue PPI  10. Primary osteoarthritis of both hands  Continue diclofenac.           Follow Up   Return in 1 year (on 3/6/2024) for MWV and fasting labs.  Patient was given instructions and counseling regarding her condition or for health maintenance advice. Please see specific information pulled into the AVS if appropriate.       Electronically  signed by Raven Lou MD, 03/06/23, 12:21 PM EST.

## 2023-03-06 NOTE — PATIENT INSTRUCTIONS
Fall Prevention in the Home, Adult  Falls can cause injuries and affect people of all ages. There are many simple things that you can do to make your home safe and to help prevent falls. Ask for help when making these changes, if needed.  What actions can I take to prevent falls?  General instructions  Use good lighting in all rooms. Replace any light bulbs that burn out, turn on lights if it is dark, and use night-lights.  Place frequently used items in easy-to-reach places. Lower the shelves around your home if necessary.  Set up furniture so that there are clear paths around it. Avoid moving your furniture around.  Remove throw rugs and other tripping hazards from the floor.  Avoid walking on wet floors.  Fix any uneven floor surfaces.  Add color or contrast paint or tape to grab bars and handrails in your home. Place contrasting color strips on the first and last steps of staircases.  When you use a stepladder, make sure that it is completely opened and that the sides and supports are firmly locked. Have someone hold the ladder while you are using it. Do not climb a closed stepladder.  Know where your pets are when moving through your home.  What can I do in the bathroom?     Keep the floor dry. Immediately clean up any water that is on the floor.  Remove soap buildup in the tub or shower regularly.  Use nonskid mats or decals on the floor of the tub or shower.  Attach bath mats securely with double-sided, nonslip rug tape.  If you need to sit down while you are in the shower, use a plastic, nonslip stool.  Install grab bars by the toilet and in the tub and shower. Do not use towel bars as grab bars.  What can I do in the bedroom?  Make sure that a bedside light is easy to reach.  Do not use oversized bedding that reaches the floor.  Have a firm chair that has side arms to use for getting dressed.  What can I do in the kitchen?  Clean up any spills right away.  If you need to reach for something above you,  use a sturdy step stool that has a grab bar.  Keep electrical cables out of the way.  Do not use floor polish or wax that makes floors slippery. If you must use wax, make sure that it is non-skid floor wax.  What can I do with my stairs?  Do not leave any items on the stairs.  Make sure that you have a light switch at the top and the bottom of the stairs. Have them installed if you do not have them.  Make sure that there are handrails on both sides of the stairs. Fix handrails that are broken or loose. Make sure that handrails are as long as the staircases.  Install non-slip stair treads on all stairs in your home.  Avoid having throw rugs at the top or bottom of stairs, or secure the rugs with carpet tape to prevent them from moving.  Choose a carpet design that does not hide the edge of steps on the stairs.  Check any carpeting to make sure that it is firmly attached to the stairs. Fix any carpet that is loose or worn.  What can I do on the outside of my home?  Use bright outdoor lighting.  Regularly repair the edges of walkways and driveways and fix any cracks.  Remove high doorway thresholds.  Trim any shrubbery on the main path into your home.  Regularly check that handrails are securely fastened and in good repair. Both sides of all steps should have handrails.  Install guardrails along the edges of any raised decks or porches.  Clear walkways of debris and clutter, including tools and rocks.  Have leaves, snow, and ice cleared regularly.  Use sand or salt on walkways during winter months.  In the garage, clean up any spills right away, including grease or oil spills.  What other actions can I take?  Wear closed-toe shoes that fit well and support your feet. Wear shoes that have rubber soles or low heels.  Use mobility aids as needed, such as canes, walkers, scooters, and crutches.  Review your medicines with your health care provider. Some medicines can cause dizziness or changes in blood pressure, which  increase your risk of falling.  Talk with your health care provider about other ways that you can decrease your risk of falls. This may include working with a physical therapist or  to improve your strength, balance, and endurance.  Where to find more information  Centers for Disease Control and PreventionSEAN: www.cdc.gov  National [place] on Aging: www.[place].nih.gov  Contact a health care provider if:  You are afraid of falling at home.  You feel weak, drowsy, or dizzy at home.  You fall at home.  Summary  There are many simple things that you can do to make your home safe and to help prevent falls.  Ways to make your home safe include removing tripping hazards and installing grab bars in the bathroom.  Ask for help when making these changes in your home.  This information is not intended to replace advice given to you by your health care provider. Make sure you discuss any questions you have with your health care provider.  Document Revised: 09/19/2022 Document Reviewed: 07/21/2021  [employer] Patient Education © 2022 [employer]      Sit-to-Stand Exercise  The sit-to-stand exercise (also known as the chair stand or chair rise exercise) strengthens your lower body and helps you maintain or improve your mobility and independence. The end goal is to do the sit-to-stand exercise without using your hands. This will be easier as you become stronger. You should always talk with your health care provider before starting any exercise program, especially if you have had recent surgery.  Do the exercise exactly as told by your health care provider and adjust it as directed. It is normal to feel mild stretching, pulling, tightness, or discomfort as you do this exercise, but you should stop right away if you feel sudden pain or your pain gets worse. Do not begin doing this exercise until told by your health care provider.  What the sit-to-stand exercise does  The sit-to-stand exercise helps to strengthen the  muscles in your thighs and the muscles in the center of your body that give you stability (core muscles). This exercise is especially helpful if:  You have had knee or hip surgery.  You have trouble getting up from a chair, out of a car, or off the toilet due to muscle weakness.  How to do the sit-to-stand exercise  Sit toward the front edge of a sturdy chair without armrests. Your knees should be bent and your feet should be flat on the floor and shoulder-width apart and underneath your hips.  Place your hands lightly on each side of the seat. Keep your back and neck as straight as possible, with your chest slightly forward.  Breathe in slowly. Lean forward and slightly shift your weight to the front of your feet.  Breathe out as you slowly stand up. Try not to support any weight with your hands.  Stand and pause for a full breath in and out.  Breathe in as you sit down slowly. Tighten your core and abdominal muscles to control your lowering as much as possible. You should lower yourself back to the chair slowly, not just drop back into the seat.  Breathe out slowly.  Do this exercise 10-15 times. If needed, do it fewer times until you build up strength.  Rest for 1 minute, then do another set of 10-15 repetitions.  To change the difficulty of the sit-to-stand exercise  If the exercise is too difficult, use a chair with sturdy armrests, and push off the armrests to help you come to the standing position. You can also use the armrests to help slowly lower yourself back to sitting. As this gets easier, try to use your arms less. You can also place a firm cushion or pillow on the chair to make the surface higher.  If this exercise is too easy, do not use your arms to help raise or lower yourself. You can also wear a weighted vest, use hand weights, increase your repetitions, or try a lower chair.  General tips  You may feel tired when starting an exercise routine. This is normal.  You may have muscle soreness that  lasts a few days. This is normal. As you get stronger, you may not feel muscle soreness.  Use smooth, steady movements.  Do not  hold your breath during strength exercises. This can cause unsafe changes in your blood pressure.  Breathe in slowly through your nose, and breathe out slowly through your mouth.  Summary  Strengthening your lower body is an important step to help you move safely and independently.  The sit-to-stand exercise helps strengthen the muscles in your thighs and core.  You should always talk with your health care provider before starting any exercise program, especially if you have had recent surgery.  This information is not intended to replace advice given to you by your health care provider. Make sure you discuss any questions you have with your health care provider.  Document Revised: 04/10/2022 Document Reviewed: 04/10/2022  [employer] Patient Education ©  [employer]    You are due for adacel Tdap vaccination. (provides protection against tetanus, diptheria and whooping cough) Please  get the immunization at your local pharmacy at your earliest convenience. This immunization will next be due in 10 years. Please click on the link for more information about this vaccine.    [facility] Tdap Vaccine Information      Medicare Wellness  Personal Prevention Plan of Service     Date of Office Visit:    Encounter Provider:  Raven Lou MD  Place of Service:  [facility]  Patient Name: Gavi Nye  :  1945    As part of the Medicare Wellness portion of your visit today, we are providing you with this personalized preventive plan of services (PPPS). This plan is based upon recommendations of the [place] Preventive Services Task Force (USPSTF) and the Advisory Committee on Immunization Practices (ACIP).    This lists the preventive care services that should be considered, and provides dates of when you are due. Items listed as completed are up-to-date and  do not require any further intervention.    Health Maintenance   Topic Date Due    DXA SCAN  Never done    TDAP/TD VACCINES (1 - Tdap) Never done    HEPATITIS C SCREENING  Never done    LIPID PANEL  06/10/2022    INFLUENZA VACCINE  08/01/2022    COVID-19 Vaccine (5 - Booster for Pfizer series) 09/15/2022    HEMOGLOBIN A1C  09/06/2023    ANNUAL WELLNESS VISIT  03/06/2024    COLORECTAL CANCER SCREENING  02/09/2026    Pneumococcal Vaccine 65+  Completed    ZOSTER VACCINE  Completed       Orders Placed This Encounter   Procedures    DEXA Bone Density, Axial (Hospital)     Standing Status:   Future     Standing Expiration Date:   3/6/2024     Order Specific Question:   Is patient taking or have taken long term Glucocorticoid (steroids)?     Answer:   No     Order Specific Question:   Does the patient have rheumatoid arthritis?     Answer:   No     Order Specific Question:   Does the patient have secondary osteoporosis?     Answer:   No     Order Specific Question:   Reason for Exam:     Answer:   screen     Order Specific Question:   Release to patient     Answer:   Routine Release    CBC (No Diff)     Standing Status:   Future     Number of Occurrences:   1     Standing Expiration Date:   3/6/2024     Order Specific Question:   Release to patient     Answer:   Routine Release    Comprehensive Metabolic Panel     Standing Status:   Future     Number of Occurrences:   1     Standing Expiration Date:   3/6/2024     Order Specific Question:   Release to patient     Answer:   Routine Release    Lipid Panel     Standing Status:   Future     Number of Occurrences:   1     Standing Expiration Date:   3/6/2024    POC Glycosylated Hemoglobin (Hb A1C)     Order Specific Question:   Release to patient     Answer:   Routine Release       Return in 1 year (on 3/6/2024) for MWV and fasting labs.

## 2023-03-20 DIAGNOSIS — G43.009 MIGRAINE WITHOUT AURA AND WITHOUT STATUS MIGRAINOSUS, NOT INTRACTABLE: ICD-10-CM

## 2023-03-20 RX ORDER — SUMATRIPTAN SUCCINATE 50 MG
TABLET ORAL
Qty: 27 TABLET | Refills: 0 | OUTPATIENT
Start: 2023-03-20

## 2023-03-20 NOTE — TELEPHONE ENCOUNTER
Imitrex 50 MG tablet     The original prescription was discontinued on 7/20/2022 by Raven Lou MD. Renewing this prescription may not be appropriate.

## 2023-05-11 ENCOUNTER — HOSPITAL ENCOUNTER (OUTPATIENT)
Dept: BONE DENSITY | Facility: HOSPITAL | Age: 78
Discharge: HOME | End: 2023-05-11
Admitting: FAMILY MEDICINE
Payer: MEDICARE

## 2023-05-11 DIAGNOSIS — Z78.0 MENOPAUSE: ICD-10-CM

## 2023-05-11 DIAGNOSIS — Z13.820 ENCOUNTER FOR SCREENING FOR OSTEOPOROSIS: ICD-10-CM

## 2023-05-11 PROCEDURE — 77080 DXA BONE DENSITY AXIAL: CPT

## 2023-05-12 ENCOUNTER — TELEPHONE (OUTPATIENT)
Dept: FAMILY MEDICINE CLINIC | Facility: CLINIC | Age: 78
End: 2023-05-12
Payer: MEDICARE

## 2023-05-12 PROBLEM — M85.851 OSTEOPENIA OF NECKS OF BOTH FEMURS: Status: ACTIVE | Noted: 2023-05-12

## 2023-05-12 PROBLEM — M85.852 OSTEOPENIA OF NECKS OF BOTH FEMURS: Status: ACTIVE | Noted: 2023-05-12

## 2023-05-12 NOTE — LETTER
May 16, 2023      Gavifranc Nye    [address] [facility] [address]        Dear Ms. Nye    Below are the results from your recent visit:    Lab on 03/06/2023   Component Date Value Ref Range Status   • WBC 03/06/2023 4.36  3.40 - 10.80 10*3/mm3 Final   • RBC 03/06/2023 4.76  3.77 - 5.28 10*6/mm3 Final   • Hemoglobin 03/06/2023 14.0  12.0 - 15.9 g/dL Final   • Hematocrit 03/06/2023 41.8  34.0 - 46.6 % Final   • MCV 03/06/2023 87.8  79.0 - 97.0 fL Final   • MCH 03/06/2023 29.4  26.6 - 33.0 pg Final   • MCHC 03/06/2023 33.5  31.5 - 35.7 g/dL Final   • RDW 03/06/2023 14.1  12.3 - 15.4 % Final   • RDW-SD 03/06/2023 44.8  37.0 - 54.0 fl Final   • MPV 03/06/2023 10.4  6.0 - 12.0 fL Final   • Platelets 03/06/2023 218  140 - 450 10*3/mm3 Final   • Glucose 03/06/2023 94  65 - 99 mg/dL Final   • BUN 03/06/2023 25 (H)  8 - 23 mg/dL Final   • Creatinine 03/06/2023 0.81  0.57 - 1.00 mg/dL Final   • Sodium 03/06/2023 142  136 - 145 mmol/L Final   • Potassium 03/06/2023 4.9  3.5 - 5.2 mmol/L Final   • Chloride 03/06/2023 107  98 - 107 mmol/L Final   • CO2 03/06/2023 29.0  22.0 - 29.0 mmol/L Final   • Calcium 03/06/2023 10.1  8.6 - 10.5 mg/dL Final   • Total Protein 03/06/2023 7.3  6.0 - 8.5 g/dL Final   • Albumin 03/06/2023 4.4  3.5 - 5.2 g/dL Final   • ALT (SGPT) 03/06/2023 13  1 - 33 U/L Final   • AST (SGOT) 03/06/2023 15  1 - 32 U/L Final   • Alkaline Phosphatase 03/06/2023 143 (H)  39 - 117 U/L Final   • Total Bilirubin 03/06/2023 0.4  0.0 - 1.2 mg/dL Final   • Globulin 03/06/2023 2.9  gm/dL Final   • A/G Ratio 03/06/2023 1.5  g/dL Final   • BUN/Creatinine Ratio 03/06/2023 30.9 (H)  7.0 - 25.0 Final   • Anion Gap 03/06/2023 6.0  5.0 - 15.0 mmol/L Final   • eGFR 03/06/2023 73.9  >60.0 mL/min/1.73 Final   • Total Cholesterol 03/06/2023 241 (H)  0 - 200 mg/dL Final   • Triglycerides 03/06/2023 181 (H)  0 - 150 mg/dL Final   • HDL Cholesterol 03/06/2023 50  40 - 60 mg/dL Final   • LDL Cholesterol  03/06/2023 158 (H)  0 -  100 mg/dL Final   • VLDL Cholesterol 03/06/2023 33  5 - 40 mg/dL Final   • LDL/HDL Ratio 03/06/2023 3.10   Final     The test results show osteopenia of the bones and an increased risk of fracture.  There are medications that can be used to help bone health.  If you are interested in treatment with medications for the osteopenia please schedule an office visit with me to discuss in more detail.  At minimum I recommend taking calcium and vitamin D supplements.      Sincerely,      Raven Lou MD

## 2023-05-12 NOTE — TELEPHONE ENCOUNTER
The test results show osteopenia of the bones and an increased risk of fracture.  There are medications that can be used to help bone health.  If you are interested in treatment with medications for the osteopenia please schedule an office visit with me to discuss in more detail.  At minimum I recommend taking calcium and vitamin D supplements.

## 2023-05-12 NOTE — TELEPHONE ENCOUNTER
Attempted to contact patient, no answer. Left voicemail for patient to call the office back (office # given).     Hub may relay message & schedule appointment.    The test results show osteopenia of the bones and an increased risk of fracture.  There are medications that can be used to help bone health.  If you are interested in treatment with medications for the osteopenia please schedule an office visit with me to discuss in more detail.  At minimum I recommend taking calcium and vitamin D supplements.

## 2023-09-25 DIAGNOSIS — M19.041 PRIMARY OSTEOARTHRITIS OF BOTH HANDS: ICD-10-CM

## 2023-09-25 DIAGNOSIS — M19.042 PRIMARY OSTEOARTHRITIS OF BOTH HANDS: ICD-10-CM

## 2023-09-25 RX ORDER — DICLOFENAC SODIUM 75 MG/1
TABLET, DELAYED RELEASE ORAL
Qty: 180 TABLET | Refills: 0 | Status: SHIPPED | OUTPATIENT
Start: 2023-09-25

## 2023-09-25 NOTE — TELEPHONE ENCOUNTER
Rx Refill Note  Requested Prescriptions     Pending Prescriptions Disp Refills    diclofenac (VOLTAREN) 75 MG EC tablet [Pharmacy Med Name: DICLOFENAC SODIUM 75MG DR TABLETS] 180 tablet 1     Sig: TAKE 1 TABLET BY MOUTH TWICE DAILY AS NEEDED FOR ARTHRITIS      Last office visit with prescribing clinician: 3/6/2023   Last telemedicine visit with prescribing clinician: Visit date not found   Next office visit with prescribing clinician: Visit date not found                         Would you like a call back once the refill request has been completed: [] Yes [] No    If the office needs to give you a call back, can they leave a voicemail: [] Yes [] No    Michelel Paz MA  09/25/23, 15:30 EDT    Return in 1 year (on 3/6/2024) for MWV and fasting labs

## 2024-03-14 DIAGNOSIS — G43.009 MIGRAINE WITHOUT AURA AND WITHOUT STATUS MIGRAINOSUS, NOT INTRACTABLE: ICD-10-CM

## 2024-03-14 NOTE — TELEPHONE ENCOUNTER
Rx Refill Note  Requested Prescriptions     Pending Prescriptions Disp Refills    eletriptan (RELPAX) 40 MG tablet [Pharmacy Med Name: ELETRIPTAN 40MG TABLETS] 9 tablet 5     Sig: TAKE 1 TABLET BY MOUTH 1 TIME FOR UP TO 1 DOSE AS NEEDED FOR MIGRAINE. MAY REPEAT DOSE FOR 1 AFTER 2 HOUR      Last office visit with prescribing clinician: 3/6/2023   Last telemedicine visit with prescribing clinician: Visit date not found   Next office visit with prescribing clinician: Visit date not found                         Would you like a call back once the refill request has been completed: [] Yes [] No    If the office needs to give you a call back, can they leave a voicemail: [] Yes [] No    Roberta Bates MA  03/14/24, 12:08 EDT

## 2024-03-14 NOTE — TELEPHONE ENCOUNTER
HUB TO READ  Called pt and lvm. Pt will need to be scheduled for a Medicare Wellness Visit before PCP is able to fill prescription as it's been over a year since we've seen her.

## 2024-03-15 NOTE — TELEPHONE ENCOUNTER
Rx Refill Note  Requested Prescriptions     Pending Prescriptions Disp Refills    eletriptan (RELPAX) 40 MG tablet [Pharmacy Med Name: ELETRIPTAN 40MG TABLETS] 9 tablet 5     Sig: TAKE 1 TABLET BY MOUTH 1 TIME FOR UP TO 1 DOSE AS NEEDED FOR MIGRAINE. MAY REPEAT DOSE FOR 1 AFTER 2 HOUR      Last office visit with prescribing clinician: 3/6/2023   Last telemedicine visit with prescribing clinician: Visit date not found   Next office visit with prescribing clinician: Visit date not found                         Would you like a call back once the refill request has been completed: [] Yes [] No    If the office needs to give you a call back, can they leave a voicemail: [] Yes [] No    Michelle Paz MA  03/15/24, 08:23 EDT    Patient was to   Return in 1 year (on 3/6/2024) for MWV and fasting labs.    Called and left vm for patient to return call     OK FOR HUB TO RELAY MESSAGE AND SCHEDULE ANNUAL MEDICARE WELLNESS

## 2024-03-18 RX ORDER — ELETRIPTAN HYDROBROMIDE 40 MG/1
TABLET, FILM COATED ORAL
Qty: 9 TABLET | Refills: 0 | OUTPATIENT
Start: 2024-03-18

## 2024-03-18 NOTE — TELEPHONE ENCOUNTER
3rd attempt    Patient was to   Return in 1 year (on 3/6/2024) for MWV and fasting labs.     Called and left  for patient to return call      OK FOR HUB TO RELAY MESSAGE AND SCHEDULE ANNUAL MEDICARE WELLNESS

## 2024-03-25 NOTE — TELEPHONE ENCOUNTER
PATIENTS  CALLED TO SEE WHY PATIENT WAS NOT ABLE TO GET HER MEDICATIONS.    I RELAYED THE HUB TO READ MESSAGE AND PATIENTS  STATED HE WOULD CALL BACK TO SCHEDULE.

## 2024-03-27 ENCOUNTER — OFFICE VISIT (OUTPATIENT)
Age: 79
End: 2024-03-27
Payer: MEDICARE

## 2024-03-27 ENCOUNTER — LAB (OUTPATIENT)
Age: 79
End: 2024-03-27
Payer: MEDICARE

## 2024-03-27 VITALS
WEIGHT: 201.6 LBS | HEART RATE: 62 BPM | DIASTOLIC BLOOD PRESSURE: 64 MMHG | HEIGHT: 62 IN | OXYGEN SATURATION: 98 % | BODY MASS INDEX: 37.1 KG/M2 | SYSTOLIC BLOOD PRESSURE: 120 MMHG

## 2024-03-27 DIAGNOSIS — Z13.0 SCREENING FOR DEFICIENCY ANEMIA: ICD-10-CM

## 2024-03-27 DIAGNOSIS — Z13.0 SCREENING FOR IRON DEFICIENCY ANEMIA: Primary | ICD-10-CM

## 2024-03-27 DIAGNOSIS — E78.2 MIXED HYPERLIPIDEMIA: ICD-10-CM

## 2024-03-27 DIAGNOSIS — R73.03 PREDIABETES: Chronic | ICD-10-CM

## 2024-03-27 DIAGNOSIS — R73.03 DIABETES MELLITUS, LATENT: ICD-10-CM

## 2024-03-27 DIAGNOSIS — G43.009 MIGRAINE WITHOUT AURA AND WITHOUT STATUS MIGRAINOSUS, NOT INTRACTABLE: Primary | Chronic | ICD-10-CM

## 2024-03-27 LAB
ALBUMIN SERPL-MCNC: 4.6 G/DL (ref 3.5–5.2)
ALBUMIN/GLOB SERPL: 1.6 G/DL
ALP SERPL-CCNC: 145 U/L (ref 39–117)
ALT SERPL W P-5'-P-CCNC: 14 U/L (ref 1–33)
ANION GAP SERPL CALCULATED.3IONS-SCNC: 10.3 MMOL/L (ref 5–15)
AST SERPL-CCNC: 17 U/L (ref 1–32)
BILIRUB SERPL-MCNC: 0.7 MG/DL (ref 0–1.2)
BUN SERPL-MCNC: 27 MG/DL (ref 8–23)
BUN/CREAT SERPL: 33.3 (ref 7–25)
CALCIUM SPEC-SCNC: 10.3 MG/DL (ref 8.6–10.5)
CHLORIDE SERPL-SCNC: 104 MMOL/L (ref 98–107)
CHOLEST SERPL-MCNC: 245 MG/DL (ref 0–200)
CO2 SERPL-SCNC: 27.7 MMOL/L (ref 22–29)
CREAT SERPL-MCNC: 0.81 MG/DL (ref 0.57–1)
DEPRECATED RDW RBC AUTO: 47.5 FL (ref 37–54)
EGFRCR SERPLBLD CKD-EPI 2021: 73.5 ML/MIN/1.73
ERYTHROCYTE [DISTWIDTH] IN BLOOD BY AUTOMATED COUNT: 15 % (ref 12.3–15.4)
GLOBULIN UR ELPH-MCNC: 2.8 GM/DL
GLUCOSE SERPL-MCNC: 88 MG/DL (ref 65–99)
HBA1C MFR BLD: 5.8 % (ref 4.8–5.6)
HCT VFR BLD AUTO: 45.1 % (ref 34–46.6)
HDLC SERPL-MCNC: 49 MG/DL (ref 40–60)
HGB BLD-MCNC: 14.6 G/DL (ref 12–15.9)
LDLC SERPL CALC-MCNC: 167 MG/DL (ref 0–100)
LDLC/HDLC SERPL: 3.36 {RATIO}
MCH RBC QN AUTO: 28.4 PG (ref 26.6–33)
MCHC RBC AUTO-ENTMCNC: 32.4 G/DL (ref 31.5–35.7)
MCV RBC AUTO: 87.7 FL (ref 79–97)
PLATELET # BLD AUTO: 265 10*3/MM3 (ref 140–450)
PMV BLD AUTO: 10.7 FL (ref 6–12)
POTASSIUM SERPL-SCNC: 4.3 MMOL/L (ref 3.5–5.2)
PROT SERPL-MCNC: 7.4 G/DL (ref 6–8.5)
RBC # BLD AUTO: 5.14 10*6/MM3 (ref 3.77–5.28)
SODIUM SERPL-SCNC: 142 MMOL/L (ref 136–145)
TRIGL SERPL-MCNC: 158 MG/DL (ref 0–150)
VLDLC SERPL-MCNC: 29 MG/DL (ref 5–40)
WBC NRBC COR # BLD AUTO: 4.39 10*3/MM3 (ref 3.4–10.8)

## 2024-03-27 PROCEDURE — 80053 COMPREHEN METABOLIC PANEL: CPT | Performed by: FAMILY MEDICINE

## 2024-03-27 PROCEDURE — 80061 LIPID PANEL: CPT | Performed by: FAMILY MEDICINE

## 2024-03-27 PROCEDURE — 85027 COMPLETE CBC AUTOMATED: CPT | Performed by: FAMILY MEDICINE

## 2024-03-27 PROCEDURE — 36415 COLL VENOUS BLD VENIPUNCTURE: CPT | Performed by: FAMILY MEDICINE

## 2024-03-27 PROCEDURE — 83036 HEMOGLOBIN GLYCOSYLATED A1C: CPT | Performed by: FAMILY MEDICINE

## 2024-03-27 RX ORDER — PROPRANOLOL HYDROCHLORIDE 120 MG/1
120 CAPSULE, EXTENDED RELEASE ORAL DAILY
Qty: 90 CAPSULE | Refills: 3 | Status: SHIPPED | OUTPATIENT
Start: 2024-03-27

## 2024-03-27 RX ORDER — SUMATRIPTAN 50 MG/1
50 TABLET, FILM COATED ORAL ONCE AS NEEDED
Qty: 9 TABLET | Refills: 5 | Status: SHIPPED | OUTPATIENT
Start: 2024-03-27

## 2024-03-27 NOTE — PROGRESS NOTES
"Chief Complaint  Med Refill and Migraine    Subjective        Gavi Nye presents to [facility]  History of Present Illness    More vertigo lately. She has a migraine when the weather changes. Especially with a cold front and windy. In the last month she had 2 migraines. Out of medication for awhile. She ends up needing second dose of relpax and 2 excedrin. Imitrex really worked with one dose. Nurtec didn't work.     Objective   Vital Signs:  /64   Pulse 62   Ht 157.5 cm (62.01\")   Wt 91.4 kg (201 lb 9.6 oz)   SpO2 98%   BMI 36.86 kg/m²   Estimated body mass index is 36.86 kg/m² as calculated from the following:    Height as of this encounter: 157.5 cm (62.01\").    Weight as of this encounter: 91.4 kg (201 lb 9.6 oz).             Physical Exam  Vitals reviewed.   Constitutional:       General: She is not in acute distress.     Appearance: She is not ill-appearing.   Cardiovascular:      Rate and Rhythm: Normal rate and regular rhythm.   Pulmonary:      Effort: Pulmonary effort is normal.      Breath sounds: Normal breath sounds.   Neurological:      Mental Status: She is alert.        Result Review :                     Assessment and Plan     Diagnoses and all orders for this visit:    1. Migraine without aura and without status migrainosus, not intractable (Primary)  -     propranolol LA (INDERAL LA) 120 MG 24 hr capsule; Take 1 capsule by mouth Daily.  Dispense: 90 capsule; Refill: 3  -     SUMAtriptan (Imitrex) 50 MG tablet; Take 1 tablet by mouth 1 (One) Time As Needed for Migraine. May repeat dose once in 2 hours if needed  Dispense: 9 tablet; Refill: 5  Uncontrolled.  Restart propranolol.  Restart Imitrex.  Relpax and Nurtec ineffective for acute migraine relief.   2. Prediabetes  -     Hemoglobin A1c; Future  Check follow-up labs.  3. Mixed hyperlipidemia  -     Comprehensive Metabolic Panel; Future  -     Lipid Panel; Future  Check follow-up labs.  Previously " taking pravastatin 80 mg.  4. Screening for deficiency anemia  -     CBC (No Diff); Future             Follow Up     Return in about 1 month (around 4/27/2024) for MWV.  Patient was given instructions and counseling regarding her condition or for health maintenance advice. Please see specific information pulled into the AVS if appropriate.       Electronically signed by Raven Lou MD, 03/27/24, 1:58 PM EDT.

## 2024-04-02 ENCOUNTER — PRIOR AUTHORIZATION (OUTPATIENT)
Age: 79
End: 2024-04-02
Payer: MEDICARE

## 2024-04-02 DIAGNOSIS — G43.009 MIGRAINE WITHOUT AURA AND WITHOUT STATUS MIGRAINOSUS, NOT INTRACTABLE: Primary | Chronic | ICD-10-CM

## 2024-04-02 NOTE — TELEPHONE ENCOUNTER
Faxed PA form, & office notes to [employer] for Imitrex RX.  Fax # [phone]  Phone # [phone]  4/2/24

## 2024-04-04 RX ORDER — RIZATRIPTAN BENZOATE 10 MG/1
10 TABLET ORAL ONCE AS NEEDED
Qty: 18 TABLET | Refills: 3 | Status: SHIPPED | OUTPATIENT
Start: 2024-04-04

## 2024-04-04 NOTE — TELEPHONE ENCOUNTER
Please call patient and inform her that the insurance recommends formulary alternative to Imitrex.  I sent a prescription for rizatriptan 10 mg to take as needed for the migraine.  Please let me know if it does not work or if there are side effects and we can try something else.  If we have tried all the formulary alternatives then the insurance company is likely to cover the Imitrex.

## 2024-04-04 NOTE — TELEPHONE ENCOUNTER
RELAY  Please call patient and inform her that the insurance recommends formulary alternative to Imitrex.  I sent a prescription for rizatriptan 10 mg to take as needed for the migraine.  Please let me know if it does not work or if there are side effects and we can try something else.  If we have tried all the formulary alternatives then the insurance company is likely to cover the Imitrex.

## 2024-04-08 NOTE — TELEPHONE ENCOUNTER
Raven Lou MD4 days ago       Please call patient and inform her that the insurance recommends formulary alternative to Imitrex.  I sent a prescription for rizatriptan 10 mg to take as needed for the migraine.  Please let me know if it does not work or if there are side effects and we can try something else.  If we have tried all the formulary alternatives then the insurance company is likely to cover the Imitrex.      Called and left vm for patient to return call    OK TO RELAY

## 2024-05-29 DIAGNOSIS — M19.041 PRIMARY OSTEOARTHRITIS OF BOTH HANDS: ICD-10-CM

## 2024-05-29 DIAGNOSIS — M19.042 PRIMARY OSTEOARTHRITIS OF BOTH HANDS: ICD-10-CM

## 2024-05-29 RX ORDER — DICLOFENAC SODIUM 75 MG/1
75 TABLET, DELAYED RELEASE ORAL 2 TIMES DAILY
Qty: 180 TABLET | Refills: 0 | Status: SHIPPED | OUTPATIENT
Start: 2024-05-29

## 2024-05-29 NOTE — TELEPHONE ENCOUNTER
Caller: Gavi Nye    Relationship: Self    Best call back number: [phone]     Requested Prescriptions:   Requested Prescriptions     Pending Prescriptions Disp Refills    diclofenac (VOLTAREN) 75 MG EC tablet 180 tablet 0        Pharm[facility] where request should be sent[id number] [place] [address]  [phone]  - [phone] FX     Last office visit with prescribing clinician: 3/27/2024   Last telemedicine visit with prescribing clinician: Visit date not found   Next office visit with prescribing clinician: 7/3/2024     Additional details provided by patient: 1 DAY LEFT     Does the patient have less than a 3 day supply:  [x] Yes  [] No    Would you like a call back once the refill request has been completed: [] Yes [x] No    If the office needs to give you a call back, can they leave a voicemail: [] Yes [x] No    Norbert Vigil Rep   05/29/24 10:52 EDT

## 2024-07-03 ENCOUNTER — OFFICE VISIT (OUTPATIENT)
Age: 79
End: 2024-07-03
Payer: MEDICARE

## 2024-07-03 ENCOUNTER — LAB (OUTPATIENT)
Age: 79
End: 2024-07-03
Payer: MEDICARE

## 2024-07-03 VITALS
OXYGEN SATURATION: 99 % | BODY MASS INDEX: 36.99 KG/M2 | DIASTOLIC BLOOD PRESSURE: 78 MMHG | RESPIRATION RATE: 16 BRPM | WEIGHT: 201 LBS | HEIGHT: 62 IN | SYSTOLIC BLOOD PRESSURE: 116 MMHG | HEART RATE: 64 BPM

## 2024-07-03 DIAGNOSIS — E78.2 MIXED HYPERLIPIDEMIA: ICD-10-CM

## 2024-07-03 DIAGNOSIS — Z23 NEED FOR VACCINATION: ICD-10-CM

## 2024-07-03 DIAGNOSIS — G43.009 MIGRAINE WITHOUT AURA AND WITHOUT STATUS MIGRAINOSUS, NOT INTRACTABLE: Chronic | ICD-10-CM

## 2024-07-03 DIAGNOSIS — R73.03 PREDIABETES: Chronic | ICD-10-CM

## 2024-07-03 DIAGNOSIS — Z00.00 MEDICARE ANNUAL WELLNESS VISIT, SUBSEQUENT: Primary | ICD-10-CM

## 2024-07-03 DIAGNOSIS — Z87.19 H/O GASTRITIS: ICD-10-CM

## 2024-07-03 DIAGNOSIS — E66.01 CLASS 2 SEVERE OBESITY DUE TO EXCESS CALORIES WITH SERIOUS COMORBIDITY AND BODY MASS INDEX (BMI) OF 36.0 TO 36.9 IN ADULT: ICD-10-CM

## 2024-07-03 PROBLEM — K92.2 GASTROINTESTINAL HEMORRHAGE: Status: RESOLVED | Noted: 2021-07-09 | Resolved: 2024-07-03

## 2024-07-03 LAB
ALBUMIN SERPL-MCNC: 4.5 G/DL (ref 3.5–5.2)
ALBUMIN/GLOB SERPL: 1.6 G/DL
ALP SERPL-CCNC: 134 U/L (ref 39–117)
ALT SERPL W P-5'-P-CCNC: 14 U/L (ref 1–33)
ANION GAP SERPL CALCULATED.3IONS-SCNC: 8.9 MMOL/L (ref 5–15)
AST SERPL-CCNC: 19 U/L (ref 1–32)
BILIRUB SERPL-MCNC: 0.5 MG/DL (ref 0–1.2)
BUN SERPL-MCNC: 20 MG/DL (ref 8–23)
BUN/CREAT SERPL: 26 (ref 7–25)
CALCIUM SPEC-SCNC: 10.5 MG/DL (ref 8.6–10.5)
CHLORIDE SERPL-SCNC: 104 MMOL/L (ref 98–107)
CHOLEST SERPL-MCNC: 230 MG/DL (ref 0–200)
CO2 SERPL-SCNC: 26.1 MMOL/L (ref 22–29)
CREAT SERPL-MCNC: 0.77 MG/DL (ref 0.57–1)
EGFRCR SERPLBLD CKD-EPI 2021: 77.6 ML/MIN/1.73
EXPIRATION DATE: ABNORMAL
GLOBULIN UR ELPH-MCNC: 2.9 GM/DL
GLUCOSE SERPL-MCNC: 94 MG/DL (ref 65–99)
HBA1C MFR BLD: 5.8 % (ref 4.5–5.7)
HDLC SERPL-MCNC: 47 MG/DL (ref 40–60)
LDLC SERPL CALC-MCNC: 154 MG/DL (ref 0–100)
LDLC/HDLC SERPL: 3.2 {RATIO}
Lab: ABNORMAL
POTASSIUM SERPL-SCNC: 4.8 MMOL/L (ref 3.5–5.2)
PROT SERPL-MCNC: 7.4 G/DL (ref 6–8.5)
SODIUM SERPL-SCNC: 139 MMOL/L (ref 136–145)
TRIGL SERPL-MCNC: 162 MG/DL (ref 0–150)
VLDLC SERPL-MCNC: 29 MG/DL (ref 5–40)

## 2024-07-03 PROCEDURE — 99214 OFFICE O/P EST MOD 30 MIN: CPT | Performed by: FAMILY MEDICINE

## 2024-07-03 PROCEDURE — 1160F RVW MEDS BY RX/DR IN RCRD: CPT | Performed by: FAMILY MEDICINE

## 2024-07-03 PROCEDURE — 80061 LIPID PANEL: CPT | Performed by: FAMILY MEDICINE

## 2024-07-03 PROCEDURE — 3044F HG A1C LEVEL LT 7.0%: CPT | Performed by: FAMILY MEDICINE

## 2024-07-03 PROCEDURE — 80053 COMPREHEN METABOLIC PANEL: CPT | Performed by: FAMILY MEDICINE

## 2024-07-03 PROCEDURE — 1125F AMNT PAIN NOTED PAIN PRSNT: CPT | Performed by: FAMILY MEDICINE

## 2024-07-03 PROCEDURE — G0439 PPPS, SUBSEQ VISIT: HCPCS | Performed by: FAMILY MEDICINE

## 2024-07-03 PROCEDURE — 90677 PCV20 VACCINE IM: CPT | Performed by: FAMILY MEDICINE

## 2024-07-03 PROCEDURE — 83036 HEMOGLOBIN GLYCOSYLATED A1C: CPT | Performed by: FAMILY MEDICINE

## 2024-07-03 PROCEDURE — 1159F MED LIST DOCD IN RCRD: CPT | Performed by: FAMILY MEDICINE

## 2024-07-03 PROCEDURE — 36415 COLL VENOUS BLD VENIPUNCTURE: CPT | Performed by: FAMILY MEDICINE

## 2024-07-03 PROCEDURE — 1170F FXNL STATUS ASSESSED: CPT | Performed by: FAMILY MEDICINE

## 2024-07-03 PROCEDURE — G0009 ADMIN PNEUMOCOCCAL VACCINE: HCPCS | Performed by: FAMILY MEDICINE

## 2024-07-03 RX ORDER — PRAVASTATIN SODIUM 80 MG/1
80 TABLET ORAL NIGHTLY
Qty: 90 TABLET | Refills: 3 | Status: SHIPPED | OUTPATIENT
Start: 2024-07-03

## 2024-07-03 RX ORDER — PANTOPRAZOLE SODIUM 40 MG/1
40 TABLET, DELAYED RELEASE ORAL DAILY
Qty: 90 TABLET | Refills: 3 | Status: SHIPPED | OUTPATIENT
Start: 2024-07-03

## 2024-07-03 RX ORDER — RIMEGEPANT SULFATE 75 MG/75MG
75 TABLET, ORALLY DISINTEGRATING ORAL ONCE AS NEEDED
Qty: 16 TABLET | Refills: 11 | Status: SHIPPED | OUTPATIENT
Start: 2024-07-03

## 2024-07-03 NOTE — PROGRESS NOTES
The ABCs of the Annual Wellness Visit  Subsequent Medicare Wellness Visit    Subjective    Gavi Nye is a 79 y.o. female who presents for a Subsequent Medicare Wellness Visit.    The following portions of the patient's history were reviewed and   updated as appropriate: allergies, current medications, past family history, past medical history, past social history, past surgical history, and problem list.    Compared to one year ago, the patient feels her physical   health is the same.    Compared to one year ago, the patient feels her mental   health is worse.    Recent Hospitalizations:  She was not admitted to the hospital during the last year.       Current Medical Providers:  Patient Care Team:  Raven Lou MD as PCP - General (Family Medicine)  Aicha Kasper MD as Consulting Physician (Orthopedic Surgery)  Soco Elizondo CCC-CJ (Audiology)    Outpatient Medications Prior to Visit   Medication Sig Dispense Refill    betamethasone valerate (VALISONE) 0.1 % cream Apply 1 Application topically to the appropriate area as directed Daily.      Cholecalciferol (VITAMIN D) 1000 units tablet Vitamin D      diclofenac (VOLTAREN) 75 MG EC tablet Take 1 tablet by mouth 2 (Two) Times a Day. 180 tablet 0    ipratropium (ATROVENT) 0.06 % nasal spray 2 sprays into the nostril(s) as directed by provider 2 (Two) Times a Day.      Omega-3 Fatty Acids (FISH OIL) 1000 MG capsule capsule Fish Oil      propranolol LA (INDERAL LA) 120 MG 24 hr capsule Take 1 capsule by mouth Daily. 90 capsule 3    pantoprazole (PROTONIX) 40 MG EC tablet Take 1 tablet by mouth Daily. 90 tablet 3    pravastatin (PRAVACHOL) 80 MG tablet Take 1 tablet by mouth Every Night. 90 tablet 3    rizatriptan (Maxalt) 10 MG tablet Take 1 tablet by mouth 1 (One) Time As Needed for Migraine. May repeat in 2 hours if needed 18 tablet 3    SUMAtriptan (Imitrex) 50 MG tablet Take 1 tablet by mouth 1 (One) Time As Needed for Migraine. May repeat  "dose once in 2 hours if needed (Patient not taking: Reported on 7/3/2024) 9 tablet 5    trolamine salicylate (ASPERCREME) 10 % cream Apply  topically to the appropriate area as directed As Needed for Muscle / Joint Pain. (Patient not taking: Reported on 7/3/2024)      venlafaxine (EFFEXOR) 100 MG tablet venlafaxine (Patient not taking: Reported on 7/3/2024)       No facility-administered medications prior to visit.       No opioid medication identified on active medication list. I have reviewed chart for other potential  high risk medication/s and harmful drug interactions in the elderly.        Aspirin is not on active medication list.   Part of excedrin migraine .    Patient Active Problem List   Diagnosis    Migraine without aura and without status migrainosus, not intractable    Primary osteoarthritis involving multiple joints    Hot flashes due to menopause    Bilateral hearing loss    Chronic rhinitis    Elevated fasting glucose    Prediabetes    Mixed hyperlipidemia    Right hand paresthesia    Right carpal tunnel syndrome    Primary osteoarthritis of both hands    Osteopenia of necks of both femurs     Advance Care Planning   Advance Care Planning     Advance Directive is on file.  ACP discussion was held with the patient during this visit. Patient has an advance directive in EMR which is still valid.      Objective    Vitals:    07/03/24 1417   BP: 116/78   BP Location: Right arm   Patient Position: Sitting   Cuff Size: Adult   Pulse: 64   Resp: 16   SpO2: 99%   Weight: 91.2 kg (201 lb)   Height: 157.5 cm (62\")   PainSc:   2   PainLoc: Generalized     Estimated body mass index is 36.76 kg/m² as calculated from the following:    Height as of this encounter: 157.5 cm (62\").    Weight as of this encounter: 91.2 kg (201 lb).    Class 2 Severe Obesity (BMI >=35 and <=39.9). Obesity-related health conditions include the following: dyslipidemias, osteoarthritis, and prediabetes . Obesity is unchanged. BMI is is " above average; BMI management plan is completed. We discussed Information on healthy weight added to patient's after visit summary.      Does the patient have evidence of cognitive impairment? No    Lab Results   Component Value Date    HGBA1C 5.8 (A) 2024        HEALTH RISK ASSESSMENT    Smoking Status:  Social History     Tobacco Use   Smoking Status Former    Current packs/day: 0.00    Average packs/day: 1 pack/day for 20.0 years (20.0 ttl pk-yrs)    Types: Cigarettes    Start date: 1998    Quit date: 2001    Years since quittin.9    Passive exposure: Past   Smokeless Tobacco Never     Alcohol Consumption:  Social History     Substance and Sexual Activity   Alcohol Use Yes    Alcohol/week: 2.0 - 4.0 standard drinks of alcohol    Types: 2 - 4 Glasses of wine per week    Comment: luis alberto      Fall Risk Screen:    SEAN Fall Risk Assessment was completed, and patient is at LOW risk for falls.Assessment completed on:7/3/2024    Depression Screenin/3/2024     2:20 PM   PHQ-2/PHQ-9 Depression Screening   Little Interest or Pleasure in Doing Things 0-->not at all   Feeling Down, Depressed or Hopeless 0-->not at all   PHQ-9: Brief Depression Severity Measure Score 0       Health Habits and Functional and Cognitive Screenin/3/2024     2:20 PM   Functional & Cognitive Status   Do you have difficulty preparing food and eating? No   Do you have difficulty bathing yourself, getting dressed or grooming yourself? No   Do you have difficulty using the toilet? No   Do you have difficulty moving around from place to place? No   Do you have trouble with steps or getting out of a bed or a chair? No   Current Diet Well Balanced Diet   Dental Exam Up to date   Eye Exam Up to date   Exercise (times per week) 0 times per week   Current Exercises Include No Regular Exercise   Do you need help using the phone?  No   Are you deaf or do you have serious difficulty hearing?  Yes   Do you need help to go  to places out of walking distance? No   Do you need help shopping? No   Do you need help preparing meals?  No   Do you need help with housework?  No   Do you need help with laundry? No   Do you need help taking your medications? No   Do you need help managing money? No   Do you ever drive or ride in a car without wearing a seat belt? No   Have you felt unusual stress, anger or loneliness in the last month? No   Who do you live with? Spouse   If you need help, do you have trouble finding someone available to you? No   Have you been bothered in the last four weeks by sexual problems? No   Do you have difficulty concentrating, remembering or making decisions? Yes       Age-appropriate Screening Schedule:  Refer to the list below for future screening recommendations based on patient's age, sex and/or medical conditions. Orders for these recommended tests are listed in the plan section. The patient has been provided with a written plan.    Health Maintenance   Topic Date Due    TDAP/TD VACCINES (1 - Tdap) Never done    RSV Vaccine - Adults (1 - 1-dose 60+ series) Never done    COVID-19 Vaccine (6 - 2023-24 season) 02/17/2024    BMI FOLLOWUP  03/06/2024    INFLUENZA VACCINE  08/01/2024    HEMOGLOBIN A1C  01/03/2025    LIPID PANEL  03/27/2025    DXA SCAN  05/11/2025    ANNUAL WELLNESS VISIT  07/03/2025    COLORECTAL CANCER SCREENING  02/09/2026    Pneumococcal Vaccine 65+  Completed    ZOSTER VACCINE  Completed                  CMS Preventative Services Quick Reference  Risk Factors Identified During Encounter  Immunizations Discussed/Encouraged: Prevnar 20 (Pneumococcal 20-valent conjugate)  The above risks/problems have been discussed with the patient.  Pertinent information has been shared with the patient in the After Visit Summary.  An After Visit Summary and PPPS were made available to the patient.    Follow Up:   Next Medicare Wellness visit to be scheduled in 1 year.       Additional E&M Note during same encounter  "follows:  Patient has multiple medical problems which are significant and separately identifiable that require additional work above and beyond the Medicare Wellness Visit.      Chief Complaint  Medicare Wellness-subsequent    Subjective        HPI  Gavi Nye is also being seen today for physical, migraines         She takes excedrin for headaches. Migraines with weather changes she had clusters for 3 days. She has to take 3 triptan to make a dent. Prior medications include propranolol, imitrex and maxalt.     She has vertigo when she first lays down or turns her head while reading in bed. Lasts only a short time. She sits on side of bed before getting out of bed to go to the bathroom.       Objective   Vital Signs:  /78 (BP Location: Right arm, Patient Position: Sitting, Cuff Size: Adult)   Pulse 64   Resp 16   Ht 157.5 cm (62\")   Wt 91.2 kg (201 lb)   SpO2 99%   BMI 36.76 kg/m²     Physical Exam  Constitutional:       General: She is not in acute distress.     Appearance: She is obese.   HENT:      Right Ear: Tympanic membrane and ear canal normal.      Left Ear: Tympanic membrane and ear canal normal.      Ears:      Comments: Bilateral hearing aids     Nose: No congestion or rhinorrhea.      Mouth/Throat:      Mouth: Mucous membranes are moist.      Pharynx: No oropharyngeal exudate or posterior oropharyngeal erythema.   Eyes:      General:         Right eye: No discharge.         Left eye: No discharge.      Conjunctiva/sclera: Conjunctivae normal.      Comments: Wears glasses   Neck:      Thyroid: No thyromegaly.   Cardiovascular:      Rate and Rhythm: Normal rate and regular rhythm.   Pulmonary:      Effort: Pulmonary effort is normal.      Breath sounds: Normal breath sounds.   Abdominal:      Palpations: Abdomen is soft. There is no hepatomegaly.      Tenderness: There is no abdominal tenderness.   Musculoskeletal:      Cervical back: Neck supple.   Lymphadenopathy:      Head:      Right side " of head: No submandibular, preauricular or posterior auricular adenopathy.      Left side of head: No submandibular, preauricular or posterior auricular adenopathy.      Cervical: No cervical adenopathy.   Skin:     General: Skin is warm.   Neurological:      Mental Status: She is alert and oriented to person, place, and time.      Gait: Gait normal.   Psychiatric:         Mood and Affect: Mood normal.         Behavior: Behavior normal.         Thought Content: Thought content normal.         Judgment: Judgment normal.          The following data was reviewed by: Raven Lou MD on 07/03/2024:  Common labs          3/27/2024    14:09 7/3/2024    14:32   Common Labs   Glucose 88     BUN 27     Creatinine 0.81     Sodium 142     Potassium 4.3     Chloride 104     Calcium 10.3     Albumin 4.6     Total Bilirubin 0.7     Alkaline Phosphatase 145     AST (SGOT) 17     ALT (SGPT) 14     WBC 4.39     Hemoglobin 14.6     Hematocrit 45.1     Platelets 265     Total Cholesterol 245     Triglycerides 158     HDL Cholesterol 49     LDL Cholesterol  167     Hemoglobin A1C 5.80  5.8      A1C Last 3 Results          3/27/2024    14:09 7/3/2024    14:32   HGBA1C Last 3 Results   Hemoglobin A1C 5.80  5.8                 Assessment and Plan   Diagnoses and all orders for this visit:    1. Medicare annual wellness visit, subsequent (Primary)  Last colonoscopy 2016 and no further screening needed unless developing symptoms.  Bone density testing up-to-date.  Check fasting labs today.  2. Prediabetes  -     POC Glycosylated Hemoglobin (Hb A1C)  Stable.  3. Mixed hyperlipidemia  -     pravastatin (PRAVACHOL) 80 MG tablet; Take 1 tablet by mouth Every Night.  Dispense: 90 tablet; Refill: 3  -     Comprehensive Metabolic Panel; Future  -     Lipid Panel; Future  Last checked in March with significant elevation.  Check follow-up labs.  LDL goal less than 100.  4. H/O gastritis  -     pantoprazole (PROTONIX) 40 MG EC tablet; Take 1  tablet by mouth Daily.  Dispense: 90 tablet; Refill: 3  Continue lifelong PPI due to history of gastrointestinal hemorrhage.  5. Class 2 severe obesity due to excess calories with serious comorbidity and body mass index (BMI) of 36.0 to 36.9 in adult  Class 2 Severe Obesity (BMI >=35 and <=39.9). Obesity-related health conditions include the following: dyslipidemias, osteoarthritis, and prediabetes. Obesity is unchanged. BMI is is above average; BMI management plan is completed. We discussed Information on healthy weight added to patient's after visit summary.  6. Migraine without aura and without status migrainosus, not intractable  -     Rimegepant Sulfate (Nurtec) 75 MG tablet dispersible tablet; Take 1 tablet by mouth 1 (One) Time As Needed (migraine. 1 tab per 24 hrs).  Dispense: 16 tablet; Refill: 11  Uncontrolled. Prior medications include propranolol, imitrex and maxalt.  Start Nurtec.  Follow-up if not improving.  7. Need for vaccination  -     Pneumococcal Conjugate Vaccine 20-Valent All           Follow Up   Return in about 1 year (around 7/4/2025) for MWV and fasting labs.  Patient was given instructions and counseling regarding her condition or for health maintenance advice. Please see specific information pulled into the AVS if appropriate.     Electronically signed by Raven Lou MD, 07/03/24, 2:53 PM EDT.

## 2024-07-03 NOTE — LETTER
[facility]  Vaccine Consent Form    Patient Name:  Gavi Nye  Patient :  1945     Vaccine(s) Ordered    Pneumococcal Conjugate Vaccine 20-Valent All        Screening Checklist  The following questions should be completed prior to vaccination. If you answer “yes” to any question, it does not necessarily mean you should not be vaccinated. It just means we may need to clarify or ask more questions. If a question is unclear, please ask your healthcare provider to explain it.    Yes No   Any fever or moderate to severe illness today (mild illness and/or antibiotic treatment are not contraindications)?     Do you have a history of a serious reaction to any previous vaccinations, such as anaphylaxis, encephalopathy within 7 days, Guillain-[place] syndrome within 6 weeks, seizure?     Have you received any live vaccine(s) (e.g MMR, LUZ) or any other vaccines in the last month (to ensure duplicate doses aren't given)?     Do you have an anaphylactic allergy to latex (DTaP, DTaP-IPV, Hep A, Hep B, MenB, RV, Td, Tdap), baker’s yeast (Hep B, HPV), polysorbates (RSV, nirsevimab, PCV 20, Rotavirrus, Tdap, Shingrix), or gelatin (LUZ, MMR)?     Do you have an anaphylactic allergy to neomycin (Rabies, LUZ, MMR, IPV, Hep A), polymyxin B (IPV), or streptomycin (IPV)?      Any cancer, leukemia, AIDS, or other immune system disorder? (LUZ, MMR, RV)     Do you have a parent, brother, or sister with an immune system problem (if immune competence of vaccine recipient clinically verified, can proceed)? (MMR, LUZ)     Any recent steroid treatments for >2 weeks, chemotherapy, or radiation treatment? (LUZ, MMR)     Have you received antibody-containing blood transfusions or IVIG in the past 11 months (recommended interval is dependent on product)? (MMR, LUZ)     Have you taken antiviral drugs (acyclovir, famciclovir, valacyclovir for LUZ) in the last 24 or 48 hours, respectively?      Are you pregnant or planning to become pregnant  "within 1 month? (LUZ, MMR, HPV, IPV, MenB, Abrexvy; For Hep B- refer to Engerix-B; For RSV - Abrysvo is indicated for 32-36 weeks of pregnancy from September to January)     For infants, have you ever been told your child has had intussusception or a medical emergency involving obstruction of the intestine (Rotavirus)? If not for an infant, can skip this question.         *Ordering Physicians/APC should be consulted if \"yes\" is checked by the patient or guardian above.  I have received, read, and understand the Vaccine Information Statement (VIS) for each vaccine ordered.  I have considered my or my child's health status as well as the health status of my close contacts.  I have taken the opportunity to discuss my vaccine questions with my or my child's health care provider.   I have requested that the ordered vaccine(s) be given to me or my child.  I understand the benefits and risks of the vaccines.  I understand that I should remain in the clinic for 15 minutes after receiving the vaccine(s).  _________________________________________________________  Signature of Patient or Parent/Legal Guardian ____________________  Date     "

## 2024-07-03 NOTE — PATIENT INSTRUCTIONS
You are due for adacel Tdap vaccination. (provides protection against tetanus, diptheria and whooping cough) Please  get the immunization at your local pharmacy at your earliest convenience. This immunization will next be due in 10 years. Please click on the link for more information about this vaccine.    [facility] Tdap Vaccine Information      Medicare Wellness  Personal Prevention Plan of Service     Date of Office Visit:    Encounter Provider:  Raven Lou MD  Place of Service:  [facility]  Patient Name: Gavi Nye  :  1945    As part of the Medicare Wellness portion of your visit today, we are providing you with this personalized preventive plan of services (PPPS). This plan is based upon recommendations of the [place] Preventive Services Task Force (USPSTF) and the Advisory Committee on Immunization Practices (ACIP).    This lists the preventive care services that should be considered, and provides dates of when you are due. Items listed as completed are up-to-date and do not require any further intervention.    Health Maintenance   Topic Date Due    TDAP/TD VACCINES (1 - Tdap) Never done    RSV Vaccine - Adults (1 - 1-dose 60+ series) Never done    COVID-19 Vaccine ( - -24 season) 2024    BMI FOLLOWUP  2024    HEMOGLOBIN A1C  2024    INFLUENZA VACCINE  2024    LIPID PANEL  2025    DXA SCAN  2025    ANNUAL WELLNESS VISIT  2025    COLORECTAL CANCER SCREENING  2026    Pneumococcal Vaccine 65+  Completed    ZOSTER VACCINE  Completed       Orders Placed This Encounter   Procedures    POC Glycosylated Hemoglobin (Hb A1C)     Order Specific Question:   Release to patient     Answer:   Routine Release [[id number]]       Return in about 1 year (around 2025) for MWV and fasting labs.

## 2024-07-08 DIAGNOSIS — E78.2 MIXED HYPERLIPIDEMIA: Primary | ICD-10-CM

## 2024-07-08 RX ORDER — EZETIMIBE 10 MG/1
10 TABLET ORAL DAILY
Qty: 90 TABLET | Refills: 3 | Status: SHIPPED | OUTPATIENT
Start: 2024-07-08

## 2024-12-11 DIAGNOSIS — M19.042 PRIMARY OSTEOARTHRITIS OF BOTH HANDS: ICD-10-CM

## 2024-12-11 DIAGNOSIS — M19.041 PRIMARY OSTEOARTHRITIS OF BOTH HANDS: ICD-10-CM

## 2024-12-11 NOTE — TELEPHONE ENCOUNTER
Rx Refill Note  Requested Prescriptions     Pending Prescriptions Disp Refills    diclofenac (VOLTAREN) 75 MG EC tablet 180 tablet 0     Sig: Take 1 tablet by mouth 2 (Two) Times a Day.      Last office visit with prescribing clinician: 7/3/2024   Last telemedicine visit with prescribing clinician: Visit date not found   Next office visit with prescribing clinician: 7/7/2025       Radha Hanley MA  12/11/24, 16:25 EST

## 2024-12-11 NOTE — TELEPHONE ENCOUNTER
Caller: Yoav Nye    Relationship: Emergency Contact    Best call back number:  [phone] (Mobile)     Requested Prescriptions:   Requested Prescriptions     Pending Prescriptions Disp Refills    diclofenac (VOLTAREN) 75 MG EC tablet 180 tablet 0     Sig: Take 1 tablet by mouth 2 (Two) Times a Day.      Pharmacy where request should be sent: [id number] [place] [address]  [phone] [facility] [phone] FX     Last office visit with prescribing clinician: 7/3/2024   Last telemedicine visit with prescribing clinician: Visit date not found   Next office visit with prescribing clinician: 7/7/2025     Does the patient have less than a 3 day supply:  [x] Yes  [] No    Would you like a call back once the refill request has been completed: [] Yes [x] No    If the office needs to give you a call back, can they leave a voicemail: [] Yes [x] No

## 2024-12-12 RX ORDER — DICLOFENAC SODIUM 75 MG/1
75 TABLET, DELAYED RELEASE ORAL 2 TIMES DAILY
Qty: 180 TABLET | Refills: 1 | Status: SHIPPED | OUTPATIENT
Start: 2024-12-12

## 2025-04-21 ENCOUNTER — TELEPHONE (OUTPATIENT)
Age: 80
End: 2025-04-21
Payer: MEDICARE

## 2025-04-21 DIAGNOSIS — G43.009 MIGRAINE WITHOUT AURA AND WITHOUT STATUS MIGRAINOSUS, NOT INTRACTABLE: Chronic | ICD-10-CM

## 2025-04-21 RX ORDER — RIZATRIPTAN BENZOATE 10 MG/1
10 TABLET ORAL ONCE AS NEEDED
Qty: 9 TABLET | Refills: 6 | Status: SHIPPED | OUTPATIENT
Start: 2025-04-21

## 2025-04-21 RX ORDER — RIZATRIPTAN BENZOATE 10 MG/1
TABLET ORAL
Qty: 18 TABLET | Refills: 3 | OUTPATIENT
Start: 2025-04-21

## 2025-04-21 NOTE — TELEPHONE ENCOUNTER
Caller: Yoav Nye    Relationship: Emergency Contact    Best call back number:  [phone] (Mobile)     Requested Prescriptions:   Requested Prescriptions      No prescriptions requested or ordered in this encounter    rizatriptan (MAXALT) 10 MG tablet [Pharmacy Med Name: RIZATRIPTAN 10MG TABLETS]     Pharmacy where request should be sent:  [facility] [address]     Last office visit with prescribing clinician: 7/3/2024   Last telemedicine visit with prescribing clinician: Visit date not found   Next office visit with prescribing clinician: 7/7/2025     Additional details provided by patient:  PATIENT'S  SAID THE PATIENT IS PRESCRIBED THIS MEDICATION   PLEASE CALL YOAV ABOUT THIS       Would you like a call back once the refill request has been completed: [] Yes [x] No    If the office needs to give you a call back, can they leave a voicemail: [] Yes [x] No

## 2025-04-21 NOTE — TELEPHONE ENCOUNTER
Rx Refill Note  Requested Prescriptions     Refused Prescriptions Disp Refills    rizatriptan (MAXALT) 10 MG tablet [Pharmacy Med Name: RIZATRIPTAN 10MG TABLETS] 18 tablet 3     Sig: TAKE 1 TABLET BY MOUTH 1 TIME AS NEEDED FOR MIGRAINE. MAY REPEAT IN 2 HOURS AS NEEDED     Refused By: JANE HILLMAN     Reason for Refusal: Medication never prescribed for the patient      Last office visit with prescribing clinician: 7/3/2024   Last telemedicine visit with prescribing clinician: Visit date not found   Next office visit with prescribing clinician: 7/7/2025   {  Jane Hillman Jr, MA  04/21/25, 10:38 EDT    Medication not on list

## 2025-04-21 NOTE — TELEPHONE ENCOUNTER
[facility] AND [facility] CONT[facility]AN.     PATIENT WOULD LIKE TO CONTINUE THE RIZATRIPTAN FOR NOW AND WOULD LIKE NEW RX SENT TO PHARMACY.

## 2025-07-09 DIAGNOSIS — M19.041 PRIMARY OSTEOARTHRITIS OF BOTH HANDS: ICD-10-CM

## 2025-07-09 DIAGNOSIS — M19.042 PRIMARY OSTEOARTHRITIS OF BOTH HANDS: ICD-10-CM

## 2025-07-09 NOTE — TELEPHONE ENCOUNTER
Caller: SandyGavi    Relationship: Self    Best call back number: [phone]     Requested Prescriptions:   Requested Prescriptions     Pending Prescriptions Disp Refills    diclofenac (VOLTAREN) 75 MG EC tablet 180 tablet 1     Sig: Take 1 tablet by mouth 2 (Two) Times a Day.      COMPLETELY OUT OF MEDICATION      Pharmacy where request [facility] be sent: [id number] [place] [address]  [phone] [facility] [phone] FX     Last office visit with prescribing clinician: 7/3/2024   Last telemedicine visit with prescribing clinician: Visit date not found   Next office visit with prescribing clinician: Visit date not found         Does the patient have less than a 3 day supply:  [x] Yes  [] No    Would you like a call back once the refill request has been completed: [] Yes [x] No    If the office needs to give you a call back, can they leave a voicemail: [] Yes [x] No    Norbert Dewitt Rep   07/09/25 10:56 EDT

## 2025-07-09 NOTE — TELEPHONE ENCOUNTER
Rx Refill Note  Requested Prescriptions     Pending Prescriptions Disp Refills    diclofenac (VOLTAREN) 75 MG EC tablet 180 tablet 1     Sig: Take 1 tablet by mouth 2 (Two) Times a Day.      Last office visit with prescribing clinician: 7/3/2024   Last telemedicine visit with prescribing clinician: Visit date not found   Next office visit with prescribing clinician: 9/4/2025     COMPLETELY OUT OF MEDICATION   Pt missed appt 7/8/25        Radha Hanley MA  07/09/25, 15:07 EDT

## 2025-07-10 RX ORDER — DICLOFENAC SODIUM 75 MG/1
75 TABLET, DELAYED RELEASE ORAL 2 TIMES DAILY
Qty: 60 TABLET | Refills: 1 | Status: SHIPPED | OUTPATIENT
Start: 2025-07-10